# Patient Record
Sex: FEMALE | Race: WHITE | Employment: UNEMPLOYED | URBAN - METROPOLITAN AREA
[De-identification: names, ages, dates, MRNs, and addresses within clinical notes are randomized per-mention and may not be internally consistent; named-entity substitution may affect disease eponyms.]

---

## 2018-01-11 ENCOUNTER — IP HISTORICAL/CONVERTED ENCOUNTER (OUTPATIENT)
Dept: OTHER | Age: 63
End: 2018-01-11

## 2018-04-14 ENCOUNTER — HOSPITAL ENCOUNTER (EMERGENCY)
Age: 63
Discharge: HOME | End: 2018-04-14
Attending: EMERGENCY MEDICINE
Payer: SUBSIDIZED

## 2018-04-14 ENCOUNTER — OFFICE VISIT (OUTPATIENT)
Dept: FAMILY MEDICINE CLINIC | Age: 63
End: 2018-04-14

## 2018-04-14 VITALS
HEART RATE: 75 BPM | BODY MASS INDEX: 34.07 KG/M2 | HEIGHT: 69 IN | DIASTOLIC BLOOD PRESSURE: 89 MMHG | SYSTOLIC BLOOD PRESSURE: 132 MMHG | OXYGEN SATURATION: 96 % | RESPIRATION RATE: 18 BRPM | TEMPERATURE: 98.9 F | WEIGHT: 230 LBS

## 2018-04-14 VITALS
BODY MASS INDEX: 33.99 KG/M2 | WEIGHT: 230.2 LBS | HEART RATE: 69 BPM | SYSTOLIC BLOOD PRESSURE: 174 MMHG | TEMPERATURE: 98.4 F | DIASTOLIC BLOOD PRESSURE: 61 MMHG

## 2018-04-14 DIAGNOSIS — Z13.9 ENCOUNTER FOR SCREENING: ICD-10-CM

## 2018-04-14 DIAGNOSIS — R09.89 LEFT CAROTID BRUIT: ICD-10-CM

## 2018-04-14 DIAGNOSIS — I63.9 CEREBROVASCULAR ACCIDENT (CVA), UNSPECIFIED MECHANISM (HCC): Primary | ICD-10-CM

## 2018-04-14 DIAGNOSIS — R53.1 RIGHT SIDED WEAKNESS: ICD-10-CM

## 2018-04-14 DIAGNOSIS — H53.2 DIPLOPIA: Primary | ICD-10-CM

## 2018-04-14 LAB — GLUCOSE POC: NORMAL MG/DL

## 2018-04-14 PROCEDURE — 99284 EMERGENCY DEPT VISIT MOD MDM: CPT

## 2018-04-14 RX ORDER — ASPIRIN 325 MG
325 TABLET ORAL DAILY
Qty: 1 TAB | Refills: 0 | Status: SHIPPED | OUTPATIENT
Start: 2018-04-14

## 2018-04-14 NOTE — ED PROVIDER NOTES
HPI Comments: 63 y.o. female with past medical history significant for DM, HTN, DVT, stroke and diverticulitis who presents from home with chief complaint of double vision. Pt reports she had 2 strokes 2-3 weeks ago. She was seen at [facility] after her initial stroke, then was sent to [facility] after a second stroke where she received doppler studies and an MRI. She was told at that time her R carotid artery is blocked. Pt was seen at the care clinic this morning for progressively decreasing vision and she developed double vision today. She also states she believes she had a seizure several nights ago d/t an episode of \"blacking out\". She takes ASA daily and quit smoking 1 month ago. There are no other acute medical concerns at this time. PCP: Amanda Ling MD    Note written by Nitza Conteh, as dictated by Bob Holder MD 4:14 PM    The history is provided by the patient. Past Medical History:   Diagnosis Date    Diverticulitis 3/2013    DVT (deep venous thrombosis) (HCC)     Hirsutism     HTN (hypertension)     Type II or unspecified type diabetes mellitus without mention of complication, not stated as uncontrolled     Virilization ([facility]        Past Surgical History:   Procedure Laterality Date    HX OTHER SURGICAL      ovaries removed          History reviewed. No pertinent family history. Social History     Social History    Marital status:      Spouse name: N/A    Number of children: N/A    Years of education: N/A     Occupational History    Not on file.      Social History Main Topics    Smoking status: Former Smoker    Smokeless tobacco: Never Used      Comment: 01/01/2018    Alcohol use No    Drug use: Yes     Special: Marijuana    Sexual activity: Not on file     Other Topics Concern    Not on file     Social History Narrative         ALLERGIES: Pcn [penicillins] and Tramadol    Review of Systems   Constitutional: Negative for chills and fever.   HENT: Negative for ear pain and sore throat. Eyes: Positive for visual disturbance. Negative for photophobia and pain. Respiratory: Negative for chest tightness and shortness of breath. Cardiovascular: Negative for chest pain and leg swelling. Gastrointestinal: Negative for abdominal pain, nausea and vomiting. Genitourinary: Negative for dysuria and flank pain. Musculoskeletal: Negative for back pain and neck pain. Skin: Negative for rash and wound. Neurological: Negative for dizziness, light-headedness and headaches. All other systems reviewed and are negative. Vitals:    04/14/18 1604   BP: 138/82   Pulse: 73   Resp: 18   Temp: 98.9 °F (37.2 °C)   SpO2: 98%   Weight: 104.3 kg (230 lb)   Height: 5' 9\" (1.753 m)            Physical Exam   Constitutional: She is oriented to person, place, and time. She appears well-developed and well-nourished. HENT:   Head: Normocephalic and atraumatic. Eyes: Conjunctivae and EOM are normal. Pupils are equal, round, and reactive to light. Right eye exhibits no discharge. Left eye exhibits no discharge. Neck: Normal range of motion. Neck supple. No bruits on right noted on my exam  Left sided bruit vs referred murmur     Cardiovascular: Normal rate and regular rhythm. Murmur heard. Murmur with referred sounds to left neck, but dissipate after a couple beats with breath holding   Pulmonary/Chest: Effort normal and breath sounds normal. No respiratory distress. She has no wheezes. Abdominal: Soft. Bowel sounds are normal. There is no tenderness. Musculoskeletal: She exhibits no tenderness or deformity. Neurological: She is alert and oriented to person, place, and time. Ambulatory into the ED   Psychiatric: She has a normal mood and affect. Nursing note and vitals reviewed.        MDM  Number of Diagnoses or Management Options  Diplopia:   Diagnosis management comments: Patient with reported stroke diagnosed 3 weeks ago, seen at the [facility] today and sent to the ED to get the care card. Patient with no new EMC, patient with multiple risk factors for stroke and ACS - patient states she stopped smoking and takes aspirin daily. She has no insurance and [facility] did a lot of tests, but she was told they could not fix her neck arteries or double vision. No new testing obtained - after 2 hours - no response from [facility] with our request for medical records. Patient with no new findings or complaints today - d/c and refer to [facility] after she begins process for care card. Advised to return to this ED if new problems arise or symptoms worsen.         ED Course       Procedures

## 2018-04-14 NOTE — DISCHARGE INSTRUCTIONS
Double Vision: Care Instructions  Your Care Instructions  Double vision means seeing two images instead of one. To see normally, your eyes, the muscles that move them, the nerves that send images to your brain, and your brain all have to work together. A problem with any of these parts can cause double vision. Double vision can occur in one or both eyes. It can be horizontal (so the images appear side by side) or vertical (so one image appears above the other). Double vision may be caused by a muscle or nerve problem. Or it may be caused by an eye problem such as a cataract or by a brain problem such as a stroke. When the cause is found, double vision can usually be corrected. To find the cause, you may need tests. These may include blood tests and imaging tests such as MRI. You may need to follow up with an eye doctor or a brain specialist (neurologist) for more testing or treatment. The doctor has checked you carefully, but problems can develop later. If you notice any problems or new symptoms, get medical treatment right away. Follow-up care is a key part of your treatment and safety. Be sure to make and go to all appointments, and call your doctor if you are having problems. It's also a good idea to know your test results and keep a list of the medicines you take. How can you care for yourself at home? · Do not drive or do other things that could be dangerous because of your double vision. · Make your home safe to help prevent injuries. For example, remove throw rugs and electrical cords that could cause falls. Be extra careful when you work with sharp tools or knives. · Ask another adult to stay with you until your vision gets better. When should you call for help? Call 911 anytime you think you may need emergency care. For example, call if:  ? · You have symptoms of a stroke.  These may include:  ¨ Sudden numbness, tingling, weakness, or loss of movement in your face, arm, or leg, especially on only one side of your body. ¨ Sudden vision changes. ¨ Sudden trouble speaking. ¨ Sudden confusion or trouble understanding simple statements. ¨ Sudden problems with walking or balance. ¨ A sudden, severe headache that is different from past headaches. ?Call your doctor now or seek immediate medical care if:  ? · You have vision changes. ? Watch closely for changes in your health, and be sure to contact your doctor if:  ? · You do not get better as expected. Where can you learn more? Go to http://molly-fran.info/. Enter P946 in the search box to learn more about \"Double Vision: Care Instructions. \"  Current as of: March 3, 2017  Content Version: 11.4  © 0914-5500 [employer], [employer]. Care instructions adapted under license by [employer] (which disclaims liability or warranty for this information). If you have questions about a medical condition or this instruction, always ask your healthcare professional. [address] any warranty or liability for your use of this information.

## 2018-04-14 NOTE — PROGRESS NOTES
Emerson Mena seen at d/c, given AVS and reviewed today's visit with patient. ED referral slip given to pt to take to [facility]. Pt to stop at home and  meds and any d/c papers she might have with her from [facility] Jan/Feb stay. APA # given with explanation of how to apply for the care card also. Attempted to reach patient by phone no answer, left VM to call clinic office back and speak to a nurse. All questions were answered to patient satisfaction.

## 2018-04-14 NOTE — PROGRESS NOTES
Results for orders placed or performed in visit on 04/14/18   AMB POC GLUCOSE BLOOD, BY GLUCOSE MONITORING DEVICE   Result Value Ref Range    Glucose  F mg/dL

## 2018-04-14 NOTE — PROGRESS NOTES
Assessment/Plan:       ICD-10-CM ICD-9-CM    1. Cerebrovascular accident (CVA), unspecified mechanism ([facility] I63.9 434.91    2. Left carotid bruit R09.89 785.9    3. Right sided weakness R53.1 728.87    4. Encounter for screening Z13.9 V82.9 AMB POC GLUCOSE BLOOD, BY GLUCOSE MONITORING DEVICE    Greater than 50% of this 25 minute visit was spent in face-to-face counseling/coordination of care regarding diabetes management. Changes made: none  Follow-up Disposition:  Return in about 1 week (around 4/21/2018) for Eryn Kiran follow up. [address], [address] [place] [address]  Phone: [phone] Fax: [phone]    [id number] [address], [facility] [address] [place], [place] [address]  Phone: [phone] Fax: [phone]      [facility]  Subjective:     Chief Complaint   Patient presents with    Other     pt states she had 2 strokes january 2018. Larissa Douglas is a 63 y.o. WHITE [place]  female who speaks [place].  used? no 2 strokes, forgets words. 2+ carotid artery bruit, was told 90% blocked by MRI. No CP or SOB. Double vision starting a few weeks ago. Double vision today with right sided weakness Quit smoking 3 months ago. That was [place], second one was [place]a [place]. Diabetes   Brought in medications? no   Last took medications: today  Last ate: yesterday   Working: no   Physical Activity? no  Measuring glucoses? no  Social History: She reports that she has quit smoking. She has never used smokeless tobacco. She reports that she uses illicit drugs, including Marijuana. She reports that she does not drink alcohol. Family History: Her family history is not on file. Past Surgical History:  has a past surgical history that includes hx other surgical.  Outpatient Medications Prior to Visit   Medication Sig Dispense Refill    NADOLOL PO Take  by mouth.  sitaGLIPtin (JANUVIA) 100 mg tablet Take 100 mg by mouth daily.  SIMVASTATIN PO Take  by mouth.  GLYBURIDE PO Take  by mouth.  LOSARTAN PO Take  by mouth.  PREGABALIN (LYRICA PO) Take  by mouth.  GABAPENTIN PO Take  by mouth.  ALPRAZolam (XANAX) 0.5 mg tablet Take  by mouth. No facility-administered medications prior to visit. A few weeks ago had a seizure, lasted almost a minute.  lets her have marijuana. Taking medications as prescribed? Not sure - didn't bring medications   Any problems to discuss? yes  Objective:     Vitals:    04/14/18 1243   BP: 174/61   Pulse: 69   Temp: 98.4 °F (36.9 °C)   TempSrc: Oral   Weight: 230 lb 3.2 oz (104.4 kg)    No LMP recorded. Patient is postmenopausal.    Results for orders placed or performed in visit on 04/14/18   AMB POC GLUCOSE BLOOD, BY GLUCOSE MONITORING DEVICE   Result Value Ref Range    Glucose  F mg/dL      Wt Readings from Last 2 Encounters:   04/14/18 230 lb 3.2 oz (104.4 kg)   12/11/14 204 lb (92.5 kg)    Weight increased; No results found for: HBA1C, UUD2ICFS, BVP5WPOU   Lab Results   Component Value Date/Time    Creatinine 0.89 07/25/2014 03:09 PM      Lab Results   Component Value Date/Time    GFR est AA 83 07/25/2014 03:09 PM    GFR est non-AA 72 07/25/2014 03:09 PM     No results found for: CHOL, CHOLX, CHLST, CHOLV, HDL, LDL, LDLC, DLDLP, TGLX, TRIGL, TRIGP, CHHD, CHHDX   Lab Results   Component Value Date/Time    ALT (SGPT) 14 07/25/2014 03:09 PM    AST (SGOT) 14 07/25/2014 03:09 PM    Alk. phosphatase 81 07/25/2014 03:09 PM    Bilirubin, total 0.2 07/25/2014 03:09 PM     Constitutional: She appears ill. Hair thinned, difficulty walking, poor balance. Eyes: EOM are normal.   Neck: Neck supple. No thyromegaly present. Cardiovascular: Swooshing carotid bruit, very loud on the left; softer on the right. Normal rate, regular rhythm.   Pulmonary/Chest: Effort normal and breath sounds normal. Musculoskeletal: She exhibits no edema. No ulcers of the lower extremities. Weakness of the left upper & lower extremities. Neuro: Facial asymmetry noted, muscles of facial expression. Assessment/Plan:   Diagnoses and all orders for this visit:    1. Cerebrovascular accident (CVA), unspecified mechanism ([address]    2. Left carotid bruit    3. Right sided weakness    4. Encounter for screening  -     AMB POC GLUCOSE BLOOD, BY GLUCOSE MONITORING DEVICE    They will bring all medications to the ER. Acute evaluation today. Plan follow up primary care with [facility] and specialty care through Care Card (most likely) but can check if would qualify for Access Now. Diabetes Mellitus type 2, under unknown control. Blood pressure under Poor control. Follow-up Disposition:  Return in about 1 week (around 4/21/2018) for [address] [place] [address] follow up. Adelso Phoenix DNP, FNP-BC, BC-ADM  Eloisa Cintron expressed understanding of this plan.

## 2018-04-14 NOTE — PROGRESS NOTES
Coordination of Care  1. Have you been to the ER, urgent care clinic since your last visit? Hospitalized since your last visit? No    2. Have you seen or consulted any other health care providers outside of the [address] [place] [address] since your last visit? Include any pap smears or colon screening. No    Does the patient need refills? N/A    Learning Assessment Complete?  yes

## 2018-04-14 NOTE — ED TRIAGE NOTES
Pt with 2 recent strokes here with continuing weakness and double vision. Went to [facility] for help today and was sent here for evaluation.

## 2018-06-05 ENCOUNTER — OFFICE VISIT (OUTPATIENT)
Dept: FAMILY MEDICINE CLINIC | Age: 63
End: 2018-06-05

## 2018-06-05 VITALS
DIASTOLIC BLOOD PRESSURE: 79 MMHG | HEART RATE: 66 BPM | HEIGHT: 69 IN | RESPIRATION RATE: 17 BRPM | TEMPERATURE: 98.3 F | SYSTOLIC BLOOD PRESSURE: 172 MMHG | OXYGEN SATURATION: 96 %

## 2018-06-05 DIAGNOSIS — R09.89 CAROTID BRUIT, UNSPECIFIED LATERALITY: ICD-10-CM

## 2018-06-05 DIAGNOSIS — Z86.73 HISTORY OF CVA (CEREBROVASCULAR ACCIDENT): Primary | ICD-10-CM

## 2018-06-05 RX ORDER — CLOPIDOGREL BISULFATE 75 MG/1
TABLET ORAL
Refills: 6 | COMMUNITY
Start: 2018-05-21

## 2018-06-05 RX ORDER — METOPROLOL SUCCINATE 50 MG/1
TABLET, EXTENDED RELEASE ORAL
Refills: 5 | COMMUNITY
Start: 2018-05-21

## 2018-06-05 RX ORDER — ATORVASTATIN CALCIUM 20 MG/1
TABLET, FILM COATED ORAL
Refills: 6 | COMMUNITY
Start: 2018-05-21 | End: 2018-08-13 | Stop reason: SDUPTHER

## 2018-06-05 RX ORDER — LISINOPRIL 20 MG/1
TABLET ORAL
Refills: 5 | COMMUNITY
Start: 2018-05-21 | End: 2018-08-13 | Stop reason: SDUPTHER

## 2018-06-05 RX ORDER — GLIPIZIDE 10 MG/1
TABLET ORAL
Refills: 6 | COMMUNITY
Start: 2018-05-21

## 2018-06-05 NOTE — PROGRESS NOTES
[address] [facility] [address]   Office [phone], Fax [phone]      Subjective:     CC:blurry vision and right sided weakness   History provided by patient and friend     HPI:  Veronica Adame is a 63 y.o. WHITE [place]  female with significant history of DM, HTN, CVA, MI of unknown time  presenting for ER follow up that was seen on 04/2018. She was seen for diplopia in 04/2018 and was discharged home to follow up with PCP. Imaging was not done at that visit. Patient reports she had stroke and MI in the past but does not remember the time. States persistence of blurry vision and right sided weakness. States she feels depressed now as she has no income to afford to follow up for any of her medical conditions and was denied for disability services. Patient denies any sleeping problems or feeling of guilty. States good appetite. Denies homicidal or suicidal ideation. .     Patient reports she quit smoking 2 months ago. Patient denies any alcohol use     Of note, patient is poor historian and doesn't recall most things including if she has taken medications this morning or not. Patient lives with her boyfriend and helps her out with her medications.        ROS (bolded are positive):   General Negative for fever, chills, changes in weight, changes in appetite   HEENT Negative for hearing loss, earache, congestion, sore throat   CV Negative for chest pain, palpitations, edema   Respiratory Negative for cough, shortness of breath, wheezing   GI Negative for change in bowel habits, abdominal pain, black or bloody stools, nausea or vomiting    Negative for frequency, dysuria, hematuria, vaginal discharge   MSK Negative for back pain, joint pain, muscle pain   Neuro Negative for dizziness, headache, confusion, weakness             Past Medical History:   Diagnosis Date    Diverticulitis 3/2013    DVT (deep venous thrombosis) (HCC)     Hirsutism     HTN (hypertension)     Type II or unspecified type diabetes mellitus without mention of complication, not stated as uncontrolled     Virilization [facility])        Current Outpatient Prescriptions on File Prior to Visit   Medication Sig Dispense Refill    aspirin (ASPIRIN) 325 mg tablet Take 1 Tab by mouth daily. Indications: Cerebral Thromboembolism Prevention 1 Tab 0    GABAPENTIN PO Take  by mouth.  NADOLOL PO Take  by mouth.  sitaGLIPtin (JANUVIA) 100 mg tablet Take 100 mg by mouth daily.  SIMVASTATIN PO Take  by mouth.  GLYBURIDE PO Take  by mouth.  LOSARTAN PO Take  by mouth.  PREGABALIN (LYRICA PO) Take  by mouth. No current facility-administered medications on file prior to visit. Allergies   Allergen Reactions    Pcn [Penicillins] Rash    Tramadol Nausea and Vomiting       Past Surgical History:   Procedure Laterality Date    HX OTHER SURGICAL      ovaries removed        Social History     Social History    Marital status:      Spouse name: N/A    Number of children: N/A    Years of education: N/A     Occupational History    Not on file. Social History Main Topics    Smoking status: Former Smoker    Smokeless tobacco: Never Used      Comment: 01/01/2018    Alcohol use No    Drug use: Yes     Special: Marijuana    Sexual activity: Not on file     Other Topics Concern    Not on file     Social History Narrative       Patient Active Problem List   Diagnosis Code    Hirsutism L68.0    Virilization ([facility] E25.9         Objective:   Vitals - reviewed  Visit Vitals    /79    Pulse 66    Temp 98.3 °F (36.8 °C) (Oral)    Resp 17    Ht 5' 9\" (1.753 m)    SpO2 96%        Physical Exam   Constitutional: She is oriented to person, place, and time. She appears well-developed and well-nourished. Tearful at times    HENT:   Head: Normocephalic and atraumatic. Neck:   Carotid bruit L>R   Cardiovascular: Normal rate and regular rhythm. Murmur heard.   Pulmonary/Chest: Effort normal and breath sounds normal. No respiratory distress. She has no wheezes. Abdominal: Soft. Bowel sounds are normal.   Musculoskeletal: Normal range of motion. Strength 5/5 bilaterally in upper and lower extremity. Neurological: She is alert and oriented to person, place, and time. No cranial nerve deficit. Skin: Skin is warm and dry. Assessment and orders:       ICD-10-CM ICD-9-CM    1. History of CVA (cerebrovascular accident) Z80.78 V12.54 REFERRAL TO NEUROLOGY   2. Carotid bruit, unspecified laterality R09.89 785.9 REFERRAL TO VASCULAR SURGERY       History of CVA  - No records on file. Patient was seen at [address] [place] [address] and [facility] in the past. Sign medical release form and obtain records. - No focal weakness appreciated on physical exam, CN II-XII intact, strength 5/5 in upper and lower extremity bilaterally, Clear speech  - Refer to Neurology for further work up     Carotid bruit with unknown carotid artery stenosis  - Per reports had dopplers done at [facility] and was told that her neck artery is \" clogged\"   - Obtain records records  - Refer to Vascular surgery    Non compliance with medications  - Patient with poor compliance of medications due to forgetfulness and finances. - Boyfriend to help with giving her medications. Coupon provided to purchase medications for chronic conditions. - Counseled on importance of taking medications on a daily basis. Advised on to increasing activity and eating fruits and vegetables. Pt was discussed and seen by Dr. Alicja Graves (attending physician). I have reviewed patient medical and social history and medications. I have reviewed pertinent labs results and other data. I have discussed the diagnosis with the patient and the intended plan as seen in the above orders. The patient has received an after-visit summary and questions were answered concerning future plans.  I have discussed medication side effects and warnings with the patient as well.     Logan Rod MD  Resident [facility]  06/05/18

## 2018-06-05 NOTE — PROGRESS NOTES
Chief Complaint   Patient presents with   [facility] Follow Up     1. Have you been to the ER, urgent care clinic since your last visit? Hospitalized since your last visit? Yes When: 4/14/18 Where: [facility]    2. Have you seen or consulted any other health care providers outside of the [address] [place] [address] since your last visit? Include any pap smears or colon screening.  No

## 2018-06-05 NOTE — MR AVS SNAPSHOT
[address] 
[phone] Patient: Eloisa Cintron MRN: [id number] OXZ:[id number] Visit Information Date & Time Provider Department Dept. Phone Encounter #  
 6/5/2018  9:45 AM Geetha Penn MD [address] [place] [address] [phone] [id number] Follow-up Instructions Return in about 4 weeks (around 7/3/2018). Upcoming Health Maintenance Date Due Hepatitis C Screening 1955 DTaP/Tdap/Td series (1 - Tdap) 7/23/1978 PAP AKA CERVICAL CYTOLOGY 7/23/1978 BREAST CANCER SCRN MAMMOGRAM 7/23/2007 FOBT Q 1 YEAR AGE 50-75 7/23/2007 ZOSTER VACCINE AGE 60> 5/23/2017 Influenza Age 5 to Adult 8/1/2018 Allergies as of 6/5/2018  Review Complete On: 6/5/2018 By: Geetha Penn MD  
  
 Severity Noted Reaction Type Reactions Pcn [Penicillins]  07/25/2014    Rash Tramadol  07/25/2014    Nausea and Vomiting Current Immunizations  Never Reviewed No immunizations on file. Not reviewed this visit You Were Diagnosed With   
  
 Codes Comments History of CVA (cerebrovascular accident)    -  Primary ICD-10-CM: Z86.73 
ICD-9-CM: V12.54 Carotid stenosis, right     ICD-10-CM: K27.65 ICD-9-CM: 433.10 Vitals BP Pulse Temp Resp Height(growth percentile) SpO2  
 172/79 66 98.3 °F (36.8 °C) (Oral) 17 5' 9\" (1.753 m) 96% OB Status Smoking Status Postmenopausal Former Smoker Vitals History Preferred Pharmacy Pharmacy Name Phone [address], [address] [place] [address] [phone] Your Updated Medication List  
  
   
This list is accurate as of 6/5/18 11:37 AM.  Always use your most recent med list.  
  
  
  
  
 aspirin 325 mg tablet Commonly known as:  ASPIRIN Take 1 Tab by mouth daily. Indications: Cerebral Thromboembolism Prevention  
  
 atorvastatin 20 mg tablet Commonly known as: LIPITOR  
TAKE ONE TABLET BY MOUTH AT BEDTIME  
  
 clopidogrel 75 mg Tab Commonly known as:  PLAVIX TAKE ONE TABLET BY MOUTH ONE TIME DAILY  
  
 GABAPENTIN PO Take  by mouth. glipiZIDE 10 mg tablet Commonly known as:  GLUCOTROL  
TAKE ONE TABLET BY MOUTH ONE TIME DAILY  
  
 GLYBURIDE PO Take  by mouth. JANUVIA 100 mg tablet Generic drug:  SITagliptin Take 100 mg by mouth daily. lisinopril 20 mg tablet Commonly known as:  PRINIVIL, ZESTRIL  
TAKE ONE TABLET BY MOUTH ONE TIME DAILY LOSARTAN PO Take  by mouth. LYRICA PO Take  by mouth.  
  
 metoprolol succinate 50 mg XL tablet Commonly known as:  TOPROL-XL  
TAKE ONE TABLET BY MOUTH ONE TIME DAILY NADOLOL PO Take  by mouth. SIMVASTATIN PO Take  by mouth. We Performed the Following REFERRAL TO NEUROLOGY [YFD53 Custom] Comments:  
 History of CVA, diplopia REFERRAL TO VASCULAR SURGERY [EEH866 Custom] Comments:  
 History of carotid artery stenosis ( per report) Follow-up Instructions Return in about 4 weeks (around 7/3/2018). Referral Information Referral ID Referred By Referred To  
  
 [id number] Latisha, [address] [place], [place] [address]MD Joshua [address] Phone: [phone] Fax: [phone] Visits Status Start Date End Date 1 New Request 6/5/18 6/5/19 If your referral has a status of pending review or denied, additional information will be sent to support the outcome of this decision. Referral ID Referred By Referred To  
 [id number] Timothy Núñez Not Available Visits Status Start Date End Date 1 New Request 6/5/18 6/5/19 If your referral has a status of pending review or denied, additional information will be sent to support the outcome of this decision. Patient Instructions Stroke: Care Instructions Your Care Instructions You have had a stroke. This means that the blood flow to a part of your brain was blocked for some time, which damages the nerve cells in that part of the brain. The part of your body controlled by that part of your brain may not function properly now. The brain is an amazing organ that can heal itself to some degree. The stroke you had damaged part of your brain. But other parts of your brain may take over in some way for the damaged areas. You have already started this process. Your doctor will talk with you about what you can do to prevent another stroke. High blood pressure, high cholesterol, and diabetes are all risk factors for stroke. If you have any of these conditions, work with your doctor to make sure they are under control. Other risk factors for stroke include being overweight, smoking, and not getting regular exercise. Going home may be hard for you and your family. The more you can try to do for yourself, the better. Remember to take each day one at a time. Follow-up care is a key part of your treatment and safety. Be sure to make and go to all appointments, and call your doctor if you are having problems. It's also a good idea to know your test results and keep a list of the medicines you take. How can you care for yourself at home? ? · Enter a stroke rehabilitation (rehab) program, if your doctor recommends it. Physical, speech, and occupational therapies can help you manage bathing, dressing, eating, and other basics of daily living. ? · Do not drive until your doctor says it is okay. ? · It is normal to feel sad or depressed after a stroke. If these feelings last, talk to your doctor. ? · If you are having problems with urine leakage, go to the bathroom at regular times, including when you first wake up and at bedtime. Also, limit fluids after dinner. ? · If you are constipated, drink plenty of fluids, enough so that your urine is light yellow or clear like water.  If you have kidney, heart, or liver disease and have to limit fluids, talk with your doctor before you increase the amount of fluids you drink. Set up a regular time for using the toilet. If you continue to have constipation, your doctor may suggest using a bulking agent, such as Metamucil, or a stool softener, laxative, or enema. Medicines ? · Take your medicines exactly as prescribed. Call your doctor if you think you are having a problem with your medicine. You may be taking several medicines. ACE (angiotensin-converting enzyme) inhibitors, angiotensin II receptor blockers (ARBs), beta-blockers, diuretics (water pills), and calcium channel blockers control your blood pressure. Statins help lower cholesterol. Your doctor may also prescribe medicines for depression, pain, sleep problems, anxiety, or agitation. ? · If your doctor has given you a blood thinner to prevent another stroke, be sure you get instructions about how to take your medicine safely. Blood thinners can cause serious bleeding problems. ? · Do not take any over-the-counter medicines or herbal products without talking to your doctor first.  
? · If you take birth control pills or hormone therapy, talk to your doctor about whether they are right for you. ? For family members and caregivers ? · Make the home safe. Set up a room so that your loved one does not have to climb stairs. Be sure the bathroom is on the same floor. Move throw rugs and furniture that could cause falls. Make sure that the lighting is good. Put grab bars and seats in tubs and showers. ? · Find out what your loved one can do and what he or she needs help with. Try not to do things for your loved one that your loved one can do on his or her own. Help him or her learn and practice new skills. ? · Visit and talk with your loved one often. Try doing activities together that you both enjoy, such as playing cards or board games. Keep in touch with your loved one's friends as much as you can.  Encourage them to visit. ? · Take care of yourself. Do not try to do everything yourself. Ask other family members to help. Eat well, get enough rest, and take time to do things that you enjoy. Keep up with your own doctor visits, and make sure to take your medicines regularly. Get out of the house as much as you can. Join a local support group. Find out if you qualify for home health care visits to help with rehab or for adult day care. When should you call for help? Call 911 anytime you think you may need emergency care. For example, call if: 
? · You have signs of another stroke. These may include: 
¨ Sudden numbness, tingling, weakness, or loss of movement in your face, arm, or leg, especially on only one side of your body. ¨ Sudden vision changes. ¨ Sudden trouble speaking. ¨ Sudden confusion or trouble understanding simple statements. ¨ Sudden problems with walking or balance. ¨ A sudden, severe headache that is different from past headaches. Call 911 even if these symptoms go away in a few minutes. ?Call your doctor now or seek immediate medical care if: 
? · You have new symptoms that may be related to your stroke, such as falls or trouble swallowing. ? Watch closely for changes in your health, and be sure to contact your doctor if you have any problems. Where can you learn more? Go to http://molly-fran.info/. Enter R259 in the search box to learn more about \"Stroke: Care Instructions. \" Current as of: March 20, 2017 Content Version: 11.4 © 1077-5792 [employer]. Care instructions adapted under license by [employer] (which disclaims liability or warranty for this information). If you have questions about a medical condition or this instruction, always ask your healthcare professional. [place] [address] any warranty or liability for your use of this information. Introducing [facility]!    
 Renita Schmitz introduces [facility] patient portal. Now you can access parts of your medical record, email your doctor's office, and request medication refills online. 1. In your internet browser, go to [email or web address] 2. Click on the First Time User? Click Here link in the Sign In box. You will see the New Member Sign Up page. 3. Enter your imgScrimmage Access Code exactly as it appears below. You will not need to use this code after youve completed the sign-up process. If you do not sign up before the expiration date, you must request a new code. · imgScrimmage Access Code: [email or web address] Expires: 7/13/2018  4:04 PM 
 
4. Enter the last four digits of your Social Security Number (xxxx) and Date of Birth (mm/dd/yyyy) as indicated and click Submit. You will be taken to the next sign-up page. 5. Create a imgScrimmage ID. This will be your imgScrimmage login ID and cannot be changed, so think of one that is secure and easy to remember. 6. Create a imgScrimmage password. You can change your password at any time. 7. Enter your Password Reset Question and Answer. This can be used at a later time if you forget your password. 8. Enter your e-mail address. You will receive e-mail notification when new information is available in [address] 9. Click Sign Up. You can now view and download portions of your medical record. 10. Click the Download Summary menu link to download a portable copy of your medical information. If you have questions, please visit the Frequently Asked Questions section of the imgScrimmage website. Remember, imgScrimmage is NOT to be used for urgent needs. For medical emergencies, dial 911. Now available from your iPhone and Android! Please provide this summary of care documentation to your next provider. Your primary care clinician is listed as Chris Lorenz. If you have any questions after today's visit, please call [phone].

## 2018-06-05 NOTE — PATIENT INSTRUCTIONS
Stroke: Care Instructions  Your Care Instructions    You have had a stroke. This means that the blood flow to a part of your brain was blocked for some time, which damages the nerve cells in that part of the brain. The part of your body controlled by that part of your brain may not function properly now. The brain is an amazing organ that can heal itself to some degree. The stroke you had damaged part of your brain. But other parts of your brain may take over in some way for the damaged areas. You have already started this process. Your doctor will talk with you about what you can do to prevent another stroke. High blood pressure, high cholesterol, and diabetes are all risk factors for stroke. If you have any of these conditions, work with your doctor to make sure they are under control. Other risk factors for stroke include being overweight, smoking, and not getting regular exercise. Going home may be hard for you and your family. The more you can try to do for yourself, the better. Remember to take each day one at a time. Follow-up care is a key part of your treatment and safety. Be sure to make and go to all appointments, and call your doctor if you are having problems. It's also a good idea to know your test results and keep a list of the medicines you take. How can you care for yourself at home? ? · Enter a stroke rehabilitation (rehab) program, if your doctor recommends it. Physical, speech, and occupational therapies can help you manage bathing, dressing, eating, and other basics of daily living. ? · Do not drive until your doctor says it is okay. ? · It is normal to feel sad or depressed after a stroke. If these feelings last, talk to your doctor. ? · If you are having problems with urine leakage, go to the bathroom at regular times, including when you first wake up and at bedtime. Also, limit fluids after dinner.    ? · If you are constipated, drink plenty of fluids, enough so that your urine is light yellow or clear like water. If you have kidney, heart, or liver disease and have to limit fluids, talk with your doctor before you increase the amount of fluids you drink. Set up a regular time for using the toilet. If you continue to have constipation, your doctor may suggest using a bulking agent, such as Metamucil, or a stool softener, laxative, or enema. Medicines  ? · Take your medicines exactly as prescribed. Call your doctor if you think you are having a problem with your medicine. You may be taking several medicines. ACE (angiotensin-converting enzyme) inhibitors, angiotensin II receptor blockers (ARBs), beta-blockers, diuretics (water pills), and calcium channel blockers control your blood pressure. Statins help lower cholesterol. Your doctor may also prescribe medicines for depression, pain, sleep problems, anxiety, or agitation. ? · If your doctor has given you a blood thinner to prevent another stroke, be sure you get instructions about how to take your medicine safely. Blood thinners can cause serious bleeding problems. ? · Do not take any over-the-counter medicines or herbal products without talking to your doctor first.   ? · If you take birth control pills or hormone therapy, talk to your doctor about whether they are right for you. ? For family members and caregivers  ? · Make the home safe. Set up a room so that your loved one does not have to climb stairs. Be sure the bathroom is on the same floor. Move throw rugs and furniture that could cause falls. Make sure that the lighting is good. Put grab bars and seats in tubs and showers. ? · Find out what your loved one can do and what he or she needs help with. Try not to do things for your loved one that your loved one can do on his or her own. Help him or her learn and practice new skills. ? · Visit and talk with your loved one often. Try doing activities together that you both enjoy, such as playing cards or board games. Keep in touch with your loved one's friends as much as you can. Encourage them to visit. ? · Take care of yourself. Do not try to do everything yourself. Ask other family members to help. Eat well, get enough rest, and take time to do things that you enjoy. Keep up with your own doctor visits, and make sure to take your medicines regularly. Get out of the house as much as you can. Join a local support group. Find out if you qualify for home health care visits to help with rehab or for adult day care. When should you call for help? Call 911 anytime you think you may need emergency care. For example, call if:  ? · You have signs of another stroke. These may include:  ¨ Sudden numbness, tingling, weakness, or loss of movement in your face, arm, or leg, especially on only one side of your body. ¨ Sudden vision changes. ¨ Sudden trouble speaking. ¨ Sudden confusion or trouble understanding simple statements. ¨ Sudden problems with walking or balance. ¨ A sudden, severe headache that is different from past headaches. Call 911 even if these symptoms go away in a few minutes. ?Call your doctor now or seek immediate medical care if:  ? · You have new symptoms that may be related to your stroke, such as falls or trouble swallowing. ? Watch closely for changes in your health, and be sure to contact your doctor if you have any problems. Where can you learn more? Go to http://molly-fran.info/. Enter G510 in the search box to learn more about \"Stroke: Care Instructions. \"  Current as of: March 20, 2017  Content Version: 11.4  © 7203-4732 [employer]. Care instructions adapted under license by [employer] (which disclaims liability or warranty for this information). If you have questions about a medical condition or this instruction, always ask your healthcare professional. [address] any warranty or liability for your use of this information.

## 2018-06-06 ENCOUNTER — DOCUMENTATION ONLY (OUTPATIENT)
Dept: FAMILY MEDICINE CLINIC | Age: 63
End: 2018-06-06

## 2018-06-06 NOTE — PROGRESS NOTES
Records from [facility]:     Active Medications starting 01/2018 include  Aspirin 162 mg PO daily  Atorvastatin calcium 20 mg PO daily  Clopidogrel 75 mg daily  Glipizide 10 mg PO daily  Lisinopril 20 mg PO daily  Metoprolol succinate 50 mg PO daily      Records from [facility]:   Cardiac Cath on 04/21/2016: by Dr. Monica Garces  1. Left main , LAD and left circumflex no disease or stenosis  2. RCA, Right dominant vessel with 100% occluded in-stent stenosis in distal RCA and proximal PLV obstructing low significantly to the PDA  3. Successful PTCA/drug eluting stent utilizing Xience 3.5x28 mm reducing 100% stenosis to 0%  4. PTCA of PDA opening vessel with good flow  5. No left ventriculography as patient had recent cardiac cath    Recommendations:  1. Aspirin for life  2. Plavix for at least 1 year  3. Ace inhibitor, BB and statin if there is not contraindication      MRI Brain WO on 01/12/ 2018: [facility] center   Multiple small acute ischemic infarcts of the left temporoparietal region. No acute hemorrhage or mass  Mild periventricular and left pontine ischemic disease. Carotid Duplex on 01/12/2018: [facility]  1. On the right, diffuse intimal thicekning with plaque at the carotid bifurcation. Internal carotid artery with 50% stenosis. Severe external carotid artery stenosis  2. On the left, apparent occlusion of the common carotid arttery with lack of flow through the proximal internal and external carotid arteries. Flow is identified at the mid external carotid and mild internal carotid artery. 3. Vertebral arteries patent with forward flow bilaterally  4. Potential left subclavian stenosis. CTA neck on 01/25/2018: [id number] [facility] [address]   1. 50% stenosis of the origin of the right internal carotid artery by NASCET criteria  2.  Total occlusion of left internal carotid artery       Recent lab from 04/03/2018  CBC: WBC 8.2, Hgb 14.4, HCT 43.5, Platelets 190  CMP: Glucose 163, BUN 17, Cr 0.88, Na 141, K 4.9, Cl 99 and HCO3 26. T. felix <0.2, AST 10, ALT 12   Lipid panel:  Total Cholesterol 163, , HDL 54, LDL 80  A1C 6.8

## 2018-06-19 ENCOUNTER — DOCUMENTATION ONLY (OUTPATIENT)
Dept: FAMILY MEDICINE CLINIC | Age: 63
End: 2018-06-19

## 2018-07-05 ENCOUNTER — OFFICE VISIT (OUTPATIENT)
Dept: NEUROLOGY | Age: 63
End: 2018-07-05

## 2018-07-05 VITALS
DIASTOLIC BLOOD PRESSURE: 84 MMHG | BODY MASS INDEX: 34.07 KG/M2 | WEIGHT: 230 LBS | RESPIRATION RATE: 20 BRPM | SYSTOLIC BLOOD PRESSURE: 138 MMHG | HEIGHT: 69 IN

## 2018-07-05 DIAGNOSIS — R41.3 MEMORY CHANGES: Primary | ICD-10-CM

## 2018-07-05 NOTE — PROGRESS NOTES
Neurology Consult      Subjective:      Crystal Tripp is a 63 y.o. female who comes in today with her friend. It is a very long-winded complicated history but apparently the latest version as she may have suffered 2 strokes in the context of recent years? Has been to [facility] on 2 occasions. Ended up at [facility] for a week in January of this year for stroke concern. Would end up that [facility] in April of this year with the history that she had significant stenosis of carotid arteries some visual changes and suggestions of medical noncompliance? There was a reference to right-sided weakness? Duly noted background hyperlipidemia diabetes hypertension coronary artery disease and history of MI and the 2 strokes. Noteworthy for obesity and had recently quit smoking. No insurance. Was referred to [facility] ER on the same day from [facility] and again the history was duly noted. They try to get information from [facility] but after 2 hours there was no intake. Apparently the history goes that [facility] could not improve upon the carotid artery stenosis and the decision was to refer her back to [facility]. Was on 325 mg strength aspirin and simvastatin and diabetic meds. Exam of the time categorize left-sided weakness and facial asymmetry and bruits on the left greater than right sides? Ended up following up with [facility] last month with a normal exam and suggestion to follow-up with neurology and vascular surgery and outpatient [facility] services. Unfortunately the referral to vascular surgery had been completely unseen and remains undone. Is here today with the neuro referral.    [place] over issues today that included diminished gait and fall prone status and uses a walker. Patient does not know why she falls. Says she has no insurance and cannot afford physical therapy.   Concerned that she cannot see out of both eyes but has not seen an eye doctor in recent times. Currently uses just reading glasses. Apparently he has snoring at night so there could be an obstructive sleep apnea component in the picture as well although the services are declined. Also grave concerns about memory impairment that have been in place over the last 2 years? There was a [facility] MRI done in January that apparently was picked up by [facility] and shown to have multiple small ischemic infarcts in the left temporal parietal area and mild periventricular and left pontine ischemic disease. Carotid Dopplers supposedly showed 50% stenosis of the right ICA and complete occlusion of the left CCA and vertebral artery flow patent bilaterally. [facility] CTA of the neck showed 50% stenosis proximal right ICA and total occlusion of the left ICA. Not sure [facility] whether a CTA of the head was done or echocardiogram or other cardiac studies etc.         Current Outpatient Prescriptions   Medication Sig Dispense Refill    atorvastatin (LIPITOR) 20 mg tablet TAKE ONE TABLET BY MOUTH AT BEDTIME  6    clopidogrel (PLAVIX) 75 mg tab TAKE ONE TABLET BY MOUTH ONE TIME DAILY  6    glipiZIDE (GLUCOTROL) 10 mg tablet TAKE ONE TABLET BY MOUTH ONE TIME DAILY  6    lisinopril (PRINIVIL, ZESTRIL) 20 mg tablet TAKE ONE TABLET BY MOUTH ONE TIME DAILY  5    metoprolol succinate (TOPROL-XL) 50 mg XL tablet TAKE ONE TABLET BY MOUTH ONE TIME DAILY  5    aspirin (ASPIRIN) 325 mg tablet Take 1 Tab by mouth daily. Indications: Cerebral Thromboembolism Prevention 1 Tab 0    NADOLOL PO Take  by mouth.  sitaGLIPtin (JANUVIA) 100 mg tablet Take 100 mg by mouth daily.  SIMVASTATIN PO Take  by mouth.  GLYBURIDE PO Take  by mouth.  LOSARTAN PO Take  by mouth.  PREGABALIN (LYRICA PO) Take  by mouth.  GABAPENTIN PO Take  by mouth.         Allergies   Allergen Reactions    Pcn [Penicillins] Rash    Tramadol Nausea and Vomiting     Past Medical History:   Diagnosis Date    Diverticulitis 3/2013    [facility]     Hirsutism     HTN (hypertension)     Type II or unspecified type diabetes mellitus without mention of complication, not stated as uncontrolled     Virilization (HCC)       Past Surgical History:   Procedure Laterality Date    HX OTHER SURGICAL      ovaries removed       Social History     Social History    Marital status:      Spouse name: N/A    Number of children: N/A    Years of education: N/A     Occupational History    Not on file. Social History Main Topics    Smoking status: Former Smoker    Smokeless tobacco: Never Used      Comment: 01/01/2018    Alcohol use No    Drug use: Yes     Special: Marijuana    Sexual activity: Not on file     Other Topics Concern    Not on file     Social History Narrative      No family history on file. Visit Vitals    /84    Resp 20    Ht 5' 9\" (1.753 m)    Wt 104.3 kg (230 lb)    BMI 33.97 kg/m2        Review of Systems:   A comprehensive review of systems was negative except for that written in the HPI. Neuro Exam:     Appearance: The patient is well developed, well nourished, unable to provide a coherent history and is in no acute distress. Mental Status: Oriented to name but not day date month or year. Mood and affect appropriate but occasionally defensive. Patient very distractable on 1,2,3 step commands. Cranial Nerves:   Intact visual fields? Fundi are benign. ACACIA, EOM's full but slow and distractible, no nystagmus, no ptosis. Facial sensation is normal. Corneal reflexes are intact. Facial movement is symmetric. Hearing is normal bilaterally. Palate is midline with normal sternocleidomastoid and trapezius muscles are normal. Tongue is midline. Motor:  5/5 strength in upper and lower proximal and distal muscles. Normal bulk and tone. No fasciculations. Reflexes:   Deep tendon reflexes 0-1+/4 and symmetrical.   Sensory:    Diminished distally approaching moderate to touch, pinprick and vibration. Position sense intact. Gait:   Transfers and gait are taken is extremely slow and cautious and has difficulty with foot placement etc.    Tremor:   No tremor noted. Cerebellar:  No cerebellar signs present. Neurovascular:  Normal heart sounds and regular rhythm, peripheral pulses intact, and no carotid bruits. Assessment:   Complicated case with cumulative medical concerns over several years and no insurance. Will suggest these issues could be additive and cutting down on her quality of life and her day by day existence. Has significant risk factor modification that needs to be watched with diabetes hypertension coronary artery disease hyperlipidemia memory impairment etc.  At this time will refer to ophthalmology as this could impact her walking and being fall prone. Will refer to neuropsychology for formal memory testing. Suggest he follow-up with cardiology regarding cardiac status. Follow-up with primary care regarding risk factor modification. Currently there is a pushback on sleep medicine referral for potential obstructive sleep apnea but will duly note this was offered. Needs to follow through with vascular surgery referral was made from primary care practice office. Will eventually suggest physical therapy once the visual issue has been addressed and need information from [facility] on 2 stroke occasions and [facility] from January of this year. Additional suggestions could follow. Plan:   Revisit pended.   Signed by :  Kathleen Desouza MD

## 2018-07-05 NOTE — PROGRESS NOTES
Stroke- 3 strokes the family member thought it was just 2, in the last 2 years   1 heart attack in the last 2 years     Bad vision- capillaries blocking her vision   Carotid arteries are blocked thinking about 60-85%   Speech- tongue tied sometimes, cindy Arias thought process is not very clear   Word finding, not making much sense when she is trying to carry on a conversation

## 2018-07-05 NOTE — PATIENT INSTRUCTIONS
A Healthy Lifestyle: Care Instructions  Your Care Instructions    A healthy lifestyle can help you feel good, stay at a healthy weight, and have plenty of energy for both work and play. A healthy lifestyle is something you can share with your whole family. A healthy lifestyle also can lower your risk for serious health problems, such as high blood pressure, heart disease, and diabetes. You can follow a few steps listed below to improve your health and the health of your family. Follow-up care is a key part of your treatment and safety. Be sure to make and go to all appointments, and call your doctor if you are having problems. It's also a good idea to know your test results and keep a list of the medicines you take. How can you care for yourself at home? · Do not eat too much sugar, fat, or fast foods. You can still have dessert and treats now and then. The goal is moderation. · Start small to improve your eating habits. Pay attention to portion sizes, drink less juice and soda pop, and eat more fruits and vegetables. ¨ Eat a healthy amount of food. A 3-ounce serving of meat, for example, is about the size of a deck of cards. Fill the rest of your plate with vegetables and whole grains. ¨ Limit the amount of soda and sports drinks you have every day. Drink more water when you are thirsty. ¨ Eat at least 5 servings of fruits and vegetables every day. It may seem like a lot, but it is not hard to reach this goal. A serving or helping is 1 piece of fruit, 1 cup of vegetables, or 2 cups of leafy, raw vegetables. Have an apple or some carrot sticks as an afternoon snack instead of a candy bar. Try to have fruits and/or vegetables at every meal.  · Make exercise part of your daily routine. You may want to start with simple activities, such as walking, bicycling, or slow swimming. Try to be active 30 to 60 minutes every day. You do not need to do all 30 to 60 minutes all at once.  For example, you can exercise 3 times a day for 10 or 20 minutes. Moderate exercise is safe for most people, but it is always a good idea to talk to your doctor before starting an exercise program.  · Keep moving. [place] the lawn, work in the garden, or [employer]. Take the stairs instead of the elevator at work. · If you smoke, quit. People who smoke have an increased risk for heart attack, stroke, cancer, and other lung illnesses. Quitting is hard, but there are ways to boost your chance of quitting tobacco for good. ¨ Use nicotine gum, patches, or lozenges. ¨ Ask your doctor about stop-smoking programs and medicines. ¨ Keep trying. In addition to reducing your risk of diseases in the future, you will notice some benefits soon after you stop using tobacco. If you have shortness of breath or asthma symptoms, they will likely get better within a few weeks after you quit. · Limit how much alcohol you drink. Moderate amounts of alcohol (up to 2 drinks a day for men, 1 drink a day for women) are okay. But drinking too much can lead to liver problems, high blood pressure, and other health problems. Family health  If you have a family, there are many things you can do together to improve your health. · Eat meals together as a family as often as possible. · Eat healthy foods. This includes fruits, vegetables, lean meats and dairy, and whole grains. · Include your family in your fitness plan. Most people think of activities such as jogging or tennis as the way to fitness, but there are many ways you and your family can be more active. Anything that makes you breathe hard and gets your heart pumping is exercise. Here are some tips:  ¨ Walk to do errands or to take your child to school or the bus. ¨ Go for a family bike ride after dinner instead of watching TV. Where can you learn more? Go to http://molly-fran.info/. Enter O892 in the search box to learn more about \"A Healthy Lifestyle: Care Instructions. \"  Current as of: May 12, 2017  Content Version: 11.4  © 1589-3297 Healthwise, [employer]. Care instructions adapted under license by [employer] (which disclaims liability or warranty for this information). If you have questions about a medical condition or this instruction, always ask your healthcare professional. [address]  any warranty or liability for your use of this information. Patient history reviewed and patient examined. A very complex case of collections of concerns that have been in place for several years. We need information from several sources that may go back several years. Will petition [facility] and [facility].  Suggestions would go to having her eyes formally checked as to her visual handicap. This would also explain in addition to other difficulties why ambulation is curtailed. Along the same lines would need a formal neuropsych evaluation of memory performance. Current concerns for possible obstructive sleep apnea but will put that on the back burner so to speak at the request of patient and family. Needs to follow through with vascular surgery referral from family practice. Needs risk factor modification that would go to reducing her next risk of stroke heart disease and similar concerns as it applies to diabetes hyperlipidemia hypertension cardiac disease. Needs follow-up with cardiology as well.   We will pend the revisit here until we can get some information in place etc.

## 2018-07-05 NOTE — MR AVS SNAPSHOT
[address] [id number] [address] [address] 
[phone] Patient: Hollis Francois MRN: [id number] RVI:[id number] Visit Information Date & Time Provider Department Dept. Phone Encounter #  
 7/5/2018  1:00 PM Petey Ambrosio MD [address] [facility] [phone] [id number] Follow-up Instructions Return if symptoms worsen or fail to improve. Upcoming Health Maintenance Date Due Hepatitis C Screening 1955 DTaP/Tdap/Td series (1 - Tdap) 7/23/1978 PAP AKA CERVICAL CYTOLOGY 7/23/1978 BREAST CANCER SCRN MAMMOGRAM 7/23/2007 FOBT Q 1 YEAR AGE 50-75 7/23/2007 ZOSTER VACCINE AGE 60> 5/23/2017 Influenza Age 5 to Adult 8/1/2018 Allergies as of 7/5/2018  Review Complete On: 7/5/2018 By: Kayla Ren LPN Severity Noted Reaction Type Reactions Pcn [Penicillins]  07/25/2014    Rash Tramadol  07/25/2014    Nausea and Vomiting Current Immunizations  Never Reviewed No immunizations on file. Not reviewed this visit You Were Diagnosed With   
  
 Codes Comments Memory changes    -  Primary ICD-10-CM: R41.3 ICD-9-CM: 780.93 Vitals BP Resp Height(growth percentile) Weight(growth percentile) BMI OB Status 138/84 20 5' 9\" (1.753 m) 230 lb (104.3 kg) 33.97 kg/m2 Postmenopausal  
 Smoking Status Former Smoker Vitals History BMI and BSA Data Body Mass Index Body Surface Area  
 33.97 kg/m 2 2.25 m 2 Preferred Pharmacy Pharmacy Name Phone [address], [address] [place] [address] [phone] Your Updated Medication List  
  
   
This list is accurate as of 7/5/18  1:55 PM.  Always use your most recent med list.  
  
  
  
  
 aspirin 325 mg tablet Commonly known as:  ASPIRIN Take 1 Tab by mouth daily.  Indications: Cerebral Thromboembolism Prevention  
  
 atorvastatin 20 mg tablet Commonly known as:  LIPITOR  
TAKE ONE TABLET BY MOUTH AT BEDTIME  
  
 clopidogrel 75 mg Tab Commonly known as:  PLAVIX TAKE ONE TABLET BY MOUTH ONE TIME DAILY  
  
 GABAPENTIN PO Take  by mouth. glipiZIDE 10 mg tablet Commonly known as:  GLUCOTROL  
TAKE ONE TABLET BY MOUTH ONE TIME DAILY  
  
 GLYBURIDE PO Take  by mouth. JANUVIA 100 mg tablet Generic drug:  SITagliptin Take 100 mg by mouth daily. lisinopril 20 mg tablet Commonly known as:  PRINIVIL, ZESTRIL  
TAKE ONE TABLET BY MOUTH ONE TIME DAILY LOSARTAN PO Take  by mouth. LYRICA PO Take  by mouth.  
  
 metoprolol succinate 50 mg XL tablet Commonly known as:  TOPROL-XL  
TAKE ONE TABLET BY MOUTH ONE TIME DAILY NADOLOL PO Take  by mouth. SIMVASTATIN PO Take  by mouth. We Performed the Following REFERRAL TO PSYCHOLOGY [JCR48 Custom] Comments:  
 Significant cumulative memory impairment which may explain several parts of her story line perhaps over years? Follow-up Instructions Return if symptoms worsen or fail to improve. Referral Information Referral ID Referred By Referred To  
  
 [id number] [facility] Cisneros [address] [place] [address] sbismark Tong, [facility] Phone: [phone] Fax: [phone] Visits Status Start Date End Date 1 New Request 7/5/18 7/5/19 If your referral has a status of pending review or denied, additional information will be sent to support the outcome of this decision. Patient Instructions A Healthy Lifestyle: Care Instructions Your Care Instructions A healthy lifestyle can help you feel good, stay at a healthy weight, and have plenty of energy for both work and play. A healthy lifestyle is something you can share with your whole family.  
A healthy lifestyle also can lower your risk for serious health problems, such as high blood pressure, heart disease, and diabetes. You can follow a few steps listed below to improve your health and the health of your family. Follow-up care is a key part of your treatment and safety. Be sure to make and go to all appointments, and call your doctor if you are having problems. It's also a good idea to know your test results and keep a list of the medicines you take. How can you care for yourself at home? · Do not eat too much sugar, fat, or fast foods. You can still have dessert and treats now and then. The goal is moderation. · Start small to improve your eating habits. Pay attention to portion sizes, drink less juice and soda pop, and eat more fruits and vegetables. ¨ Eat a healthy amount of food. A 3-ounce serving of meat, for example, is about the size of a deck of cards. Fill the rest of your plate with vegetables and whole grains. ¨ Limit the amount of soda and sports drinks you have every day. Drink more water when you are thirsty. ¨ Eat at least 5 servings of fruits and vegetables every day. It may seem like a lot, but it is not hard to reach this goal. A serving or helping is 1 piece of fruit, 1 cup of vegetables, or 2 cups of leafy, raw vegetables. Have an apple or some carrot sticks as an afternoon snack instead of a candy bar. Try to have fruits and/or vegetables at every meal. 
· Make exercise part of your daily routine. You may want to start with simple activities, such as walking, bicycling, or slow swimming. Try to be active 30 to 60 minutes every day. You do not need to do all 30 to 60 minutes all at once. For example, you can exercise 3 times a day for 10 or 20 minutes. Moderate exercise is safe for most people, but it is always a good idea to talk to your doctor before starting an exercise program. 
· Keep moving. [employer] the lawn, work in the garden, or [employer]. Take the stairs instead of the elevator at work. · If you smoke, quit.  People who smoke have an increased risk for heart attack, stroke, cancer, and other lung illnesses. Quitting is hard, but there are ways to boost your chance of quitting tobacco for good. ¨ Use nicotine gum, patches, or lozenges. ¨ Ask your doctor about stop-smoking programs and medicines. ¨ Keep trying. In addition to reducing your risk of diseases in the future, you will notice some benefits soon after you stop using tobacco. If you have shortness of breath or asthma symptoms, they will likely get better within a few weeks after you quit. · Limit how much alcohol you drink. Moderate amounts of alcohol (up to 2 drinks a day for men, 1 drink a day for women) are okay. But drinking too much can lead to liver problems, high blood pressure, and other health problems. Family health If you have a family, there are many things you can do together to improve your health. · Eat meals together as a family as often as possible. · Eat healthy foods. This includes fruits, vegetables, lean meats and dairy, and whole grains. · Include your family in your fitness plan. Most people think of activities such as jogging or tennis as the way to fitness, but there are many ways you and your family can be more active. Anything that makes you breathe hard and gets your heart pumping is exercise. Here are some tips: 
¨ Walk to do errands or to take your child to school or the bus. ¨ Go for a family bike ride after dinner instead of watching TV. Where can you learn more? Go to http://molly-fran.info/. Enter H930 in the search box to learn more about \"A Healthy Lifestyle: Care Instructions. \" Current as of: May 12, 2017 Content Version: 11.4 © 4773-8163 Healthwise, Incorporated. Care instructions adapted under license by [employer] (which disclaims liability or warranty for this information).  If you have questions about a medical condition or this instruction, always ask your healthcare professional. Mar[employer] disclaims any warranty or liability for your use of this information. Patient history reviewed and patient examined. A very complex case of collections of concerns that have been in place for several years. We need information from several sources that may go back several years. Will petition [facility] and [facility].  Suggestions would go to having her eyes formally checked as to her visual handicap. This would also explain in addition to other difficulties why ambulation is curtailed. Along the same lines would need a formal neuropsych evaluation of memory performance. Current concerns for possible obstructive sleep apnea but will put that on the back burner so to speak at the request of patient and family. Needs to follow through with vascular surgery referral from family practice. Needs risk factor modification that would go to reducing her next risk of stroke heart disease and similar concerns as it applies to diabetes hyperlipidemia hypertension cardiac disease. Needs follow-up with cardiology as well. We will pend the revisit here until we can get some information in place etc. 
 
 
  
Introducing hospitals & HEALTH SERVICES! Kike Jose introduces Mindframe patient portal. Now you can access parts of your medical record, email your doctor's office, and request medication refills online. 1. In your internet browser, go to [email or web address] 2. Click on the First Time User? Click Here link in the Sign In box. You will see the New Member Sign Up page. 3. Enter your Mindframe Access Code exactly as it appears below. You will not need to use this code after youve completed the sign-up process. If you do not sign up before the expiration date, you must request a new code. · Mindframe Access Code: [email or web address] Expires: 7/13/2018  4:04 PM 
 
4.  Enter the last four digits of your Social Security Number (xxxx) and Date of Birth (mm/dd/yyyy) as indicated and click Submit. You will be taken to the next sign-up page. 5. Create a Schedule Savvy ID. This will be your Schedule Savvy login ID and cannot be changed, so think of one that is secure and easy to remember. 6. Create a Schedule Savvy password. You can change your password at any time. 7. Enter your Password Reset Question and Answer. This can be used at a later time if you forget your password. 8. Enter your e-mail address. You will receive e-mail notification when new information is available in [address]. 9. Click Sign Up. You can now view and download portions of your medical record. 10. Click the Download Summary menu link to download a portable copy of your medical information. If you have questions, please visit the Frequently Asked Questions section of the Schedule Savvy website. Remember, Schedule Savvy is NOT to be used for urgent needs. For medical emergencies, dial 911. Now available from your iPhone and Android! Please provide this summary of care documentation to your next provider. Your primary care clinician is listed as Chris Lornez. If you have any questions after today's visit, please call [phone].

## 2018-07-26 ENCOUNTER — OFFICE VISIT (OUTPATIENT)
Dept: SURGERY | Age: 63
End: 2018-07-26

## 2018-07-26 VITALS
DIASTOLIC BLOOD PRESSURE: 89 MMHG | HEIGHT: 69 IN | OXYGEN SATURATION: 96 % | SYSTOLIC BLOOD PRESSURE: 182 MMHG | WEIGHT: 232 LBS | BODY MASS INDEX: 34.36 KG/M2 | TEMPERATURE: 98.7 F | HEART RATE: 67 BPM

## 2018-07-26 DIAGNOSIS — I77.9 BILATERAL CAROTID ARTERY DISEASE (HCC): Primary | ICD-10-CM

## 2018-07-26 NOTE — MR AVS SNAPSHOT
[address], [address] [facility], [address] [place] [address] [facility] 
[phone] Patient: Beth Burgos MRN: [id number] [facility]:[birth year] Visit Information Date & Time Provider Department Dept. Phone Encounter #  
 7/26/2018  1:20 PM Poonam Rouse MD [facility] [address] [phone] [id number] Your Appointments 2/25/2019  1:00 PM  
New Patient with Shawn Hernández PsyD [address] ([address]) Appt Note: new patient memory impairment/ [place] [address] [facility] [address] [id number] [phone]  
  
   
 [place] [address] [address] [address] Upcoming Health Maintenance Date Due Hepatitis C Screening 1955 DTaP/Tdap/Td series (1 - Tdap) 7/23/1978 PAP AKA CERVICAL CYTOLOGY 7/23/1978 BREAST CANCER SCRN MAMMOGRAM 7/23/2007 FOBT Q 1 YEAR AGE 50-75 7/23/2007 ZOSTER VACCINE AGE 60> 5/23/2017 Influenza Age 5 to Adult 8/1/2018 Allergies as of 7/26/2018  Review Complete On: 7/26/2018 By: Poonam Rouse MD  
  
 Severity Noted Reaction Type Reactions Pcn [Penicillins]  07/25/2014    Rash Tramadol  07/25/2014    Nausea and Vomiting Current Immunizations  Never Reviewed No immunizations on file. Not reviewed this visit You Were Diagnosed With   
  
 Codes Comments Bilateral [facility]    -  Primary ICD-10-CM: I77.9 ICD-9-CM: 067. 9 Vitals BP Pulse Temp Height(growth percentile) Weight(growth percentile) SpO2  
 182/89 (BP 1 Location: Right arm, BP Patient Position: Sitting) 67 98.7 °F (37.1 °C) 5' 9\" (1.753 m) 232 lb (105.2 kg) 96% BMI OB Status Smoking Status 34.26 kg/m2 Postmenopausal Former Smoker BMI and BSA Data Body Mass Index Body Surface Area  
 34.26 kg/m 2 2.26 m 2 Preferred Pharmacy Pharmacy Name Phone [address], [address] [facility] [phone] Your Updated Medication List  
  
   
This list is accurate as of 7/26/18  4:34 PM.  Always use your most recent med list.  
  
  
  
  
 aspirin 325 mg tablet Commonly known as:  ASPIRIN Take 1 Tab by mouth daily. Indications: Cerebral Thromboembolism Prevention  
  
 atorvastatin 20 mg tablet Commonly known as:  LIPITOR  
TAKE ONE TABLET BY MOUTH AT BEDTIME  
  
 clopidogrel 75 mg Tab Commonly known as:  PLAVIX TAKE ONE TABLET BY MOUTH ONE TIME DAILY  
  
 GABAPENTIN PO Take  by mouth. glipiZIDE 10 mg tablet Commonly known as:  GLUCOTROL  
TAKE ONE TABLET BY MOUTH ONE TIME DAILY  
  
 GLYBURIDE PO Take  by mouth. JANUVIA 100 mg tablet Generic drug:  SITagliptin Take 100 mg by mouth daily. lisinopril 20 mg tablet Commonly known as:  PRINIVIL, ZESTRIL  
TAKE ONE TABLET BY MOUTH ONE TIME DAILY LOSARTAN PO Take  by mouth. LYRICA PO Take  by mouth.  
  
 metoprolol succinate 50 mg XL tablet Commonly known as:  TOPROL-XL  
TAKE ONE TABLET BY MOUTH ONE TIME DAILY NADOLOL PO Take  by mouth. SIMVASTATIN PO Take  by mouth. Introducing [facility]! [employer] introduces [employer] patient portal. Now you can access parts of your medical record, email your doctor's office, and request medication refills online. 1. In your internet browser, go to [email or web address] 2. Click on the First Time User? Click Here link in the Sign In box. You will see the New Member Sign Up page. 3. Enter your [employer] Access Code exactly as it appears below. You will not need to use this code after youve completed the sign-up process. If you do not sign up before the expiration date, you must request a new code. · [employer] Access Code: [email or web address] Expires: 10/24/2018  1:29 PM 
 
4.  Enter the last four digits of your Social Security Number (xxxx) and Date of Birth (mm/dd/yyyy) as indicated and click Submit. You will be taken to the next sign-up page. 5. Create a [employer] ID. This will be your [employer] login ID and cannot be changed, so think of one that is secure and easy to remember. 6. Create a [employer] password. You can change your password at any time. 7. Enter your Password Reset Question and Answer. This can be used at a later time if you forget your password. 8. Enter your e-mail address. You will receive e-mail notification when new information is available in [address] 9. Click Sign Up. You can now view and download portions of your medical record. 10. Click the Download Summary menu link to download a portable copy of your medical information. If you have questions, please visit the Frequently Asked Questions section of the [email or web address] website. Remember, [email or web address] is NOT to be used for urgent needs. For medical emergencies, dial 911. Now available from your iPhone and Android! Please provide this summary of care documentation to your next provider. Your primary care clinician is listed as Chris Lorenz. If you have any questions after today's visit, please call [phone].

## 2018-07-26 NOTE — PROGRESS NOTES
The patient is a 66 y.o. woman referred by Harley Lopes MD regarding carotid artery disease. The patient herself is reported to have difficulties with memory and not able to give a clear history. Dr. Kevin Amor note indicates the patient has a history of stroke and has previously had evaluation of the carotid arteries by ultrasound and by CTA. The CTA is reported to have been done at [facility]. According to Dr. Kevin Amor note, the patient had finding of total occlusion of left internal carotid artery and about a 50% diameter reduction of right internal carotid artery. On questioning today, the patient and a caretaker who accompanied her indicate the patient has not had any recent lateralizing neurological symptoms. The patient does have a history of diabetes. She is a former smoker. She is treated for hypertension and hyperlipidemia. The patient denies anginal chest pain, irregular heart beat, shortness of breath at rest or with exertion. The patient is able to walk short distances utilizing a walker. Physical Examination:   Visit Vitals    /89 (BP 1 Location: Right arm, BP Patient Position: Sitting)    Pulse 67    Temp 98.7 °F (37.1 °C)    Ht 5' 9\" (1.753 m)    Wt 105.2 kg (232 lb)    SpO2 96%    BMI 34.26 kg/m2   GENERAL:  Alert overweight woman in no acute distress. HEAD/NECK: No jaundice, mass or thyromegaly. There was a left neck bruit. Temporal pulse was normal on the right and reduced on the left. Carotid pulse was normal on the right and somewhat reduced on the left. I did not see a scan of the patient's CTA report from [facility] or any recent carotid duplex ultrasound examination. I had the patient sign a request for medical records from [facility] for results of CTA of head and neck. Depending on that result, I can determine if a new duplex ultrasound needs to be done now or as a follow up measure later.     If the stated result of the CTA is as reported, with occlusion of left internal carotid artery and 50% diameter reduction of right internal carotid artery, then no surgical intervention would be indicated. I explained to the patient that a totally occluded internal carotid artery cannot be reopened. On the right side, 50% diameter reduction would not warrant any surgical intervention. I have asked the patient or caretaker to call the office in 2 weeks to discuss results evaluation of the CTA and any further recommendations. Most likely she will simply continue with ultrasound surveillance of the carotid arteries. I did recommend the patient be strictly compliant with medications and diet with regard to treatment of her diabetes, hyperlipidemia and hypertension. The patient is presently taking one aspirin per day. She is also taking Plavix. Final Diagnosis:  Carotid artery disease.     cc: Adelaida Raines MD

## 2018-07-26 NOTE — PROGRESS NOTES
1. Have you been to the ER, urgent care clinic since your last visit? Hospitalized since your last visit? New patient2. Have you seen or consulted any other health care providers outside of the [address] [place] [address] since your last visit? Include any pap smears or colon screening. New patient    Discussed advanced directive. Patient states that she does not have an advanced directive.

## 2018-08-13 ENCOUNTER — OFFICE VISIT (OUTPATIENT)
Dept: FAMILY MEDICINE CLINIC | Age: 63
End: 2018-08-13

## 2018-08-13 VITALS
TEMPERATURE: 98.4 F | SYSTOLIC BLOOD PRESSURE: 168 MMHG | HEART RATE: 66 BPM | BODY MASS INDEX: 34.36 KG/M2 | OXYGEN SATURATION: 100 % | HEIGHT: 69 IN | RESPIRATION RATE: 18 BRPM | WEIGHT: 232 LBS | DIASTOLIC BLOOD PRESSURE: 80 MMHG

## 2018-08-13 DIAGNOSIS — E78.5 HYPERLIPIDEMIA, UNSPECIFIED HYPERLIPIDEMIA TYPE: ICD-10-CM

## 2018-08-13 DIAGNOSIS — I25.10 CORONARY ARTERY DISEASE INVOLVING NATIVE HEART WITHOUT ANGINA PECTORIS, UNSPECIFIED VESSEL OR LESION TYPE: ICD-10-CM

## 2018-08-13 DIAGNOSIS — Z86.73 HISTORY OF CVA (CEREBROVASCULAR ACCIDENT): ICD-10-CM

## 2018-08-13 DIAGNOSIS — E66.9 OBESITY (BMI 30-39.9): ICD-10-CM

## 2018-08-13 DIAGNOSIS — I77.9 BILATERAL CAROTID ARTERY DISEASE (HCC): ICD-10-CM

## 2018-08-13 DIAGNOSIS — E11.8 CONTROLLED TYPE 2 DIABETES MELLITUS WITH COMPLICATION, WITHOUT LONG-TERM CURRENT USE OF INSULIN (HCC): ICD-10-CM

## 2018-08-13 DIAGNOSIS — R41.3 MEMORY IMPAIRMENT: ICD-10-CM

## 2018-08-13 DIAGNOSIS — I10 ESSENTIAL HYPERTENSION: Primary | ICD-10-CM

## 2018-08-13 PROBLEM — E78.49 OTHER HYPERLIPIDEMIA: Status: ACTIVE | Noted: 2018-08-13

## 2018-08-13 PROBLEM — E11.9 CONTROLLED TYPE 2 DIABETES MELLITUS, WITHOUT LONG-TERM CURRENT USE OF INSULIN (HCC): Status: ACTIVE | Noted: 2018-08-13

## 2018-08-13 RX ORDER — ATORVASTATIN CALCIUM 20 MG/1
40 TABLET, FILM COATED ORAL DAILY
Qty: 30 TAB | Refills: 6 | Status: SHIPPED | OUTPATIENT
Start: 2018-08-13 | End: 2018-08-17 | Stop reason: SDUPTHER

## 2018-08-13 RX ORDER — LISINOPRIL 20 MG/1
40 TABLET ORAL DAILY
Qty: 30 TAB | Refills: 5 | Status: SHIPPED | OUTPATIENT
Start: 2018-08-13 | End: 2018-08-17 | Stop reason: SDUPTHER

## 2018-08-13 NOTE — PROGRESS NOTES
[address] [facility] [address]   Office [phone], Fax [phone]      Subjective:     CC: Routine follow up   History provided by patient and friend    HPI:  Lukas Meng is a 66 y.o. WHITE OR  female with significant history of HTN, HLD, Type 2 diabetes, CAD, CVA and bilateral carotid artery disease presenting for routine care follow up. Since her last visit on 06/05, she was seen by neurology - Dr. David Hernández who had referred her to Neuropsych for memory testing. She was also seen by Vascular surgery who's awating results of CTA from [facility] and to determine for indication of carotid duplex ultrasound. Today, patient reports she's doing well. She denies any falls, focal weakness, dizziness, chest pain or shortness of breath. Reports that she has no means of income and unable to buy basic medical supplies including BP cuff or glucometer to check her sugars. She is accompanied by her friend who's helping her with organizing and scheduling her medical appointments. Reports that she has an appointment with Neuropsych in February. In the process of making an appointment with Ophthalmology. Also applying for disability at this time. Patient quit smoking 3 months ago. Review of Systems   Constitutional: Negative for chills and fever. Eyes: Negative for pain. Respiratory: Negative for cough and shortness of breath. Cardiovascular: Negative for chest pain, palpitations and leg swelling. Gastrointestinal: Negative for abdominal pain, blood in stool, nausea and vomiting. Genitourinary: Negative for dysuria. Musculoskeletal: Negative for falls. Skin: Negative for rash. Neurological: Positive for weakness. Negative for focal weakness and loss of consciousness.        Past Medical History:   Diagnosis Date    Depression     Diverticulitis 3/2013    DVT (deep venous thrombosis) (HCC)     Hirsutism     HTN (hypertension)     Hypercholesterolemia     Stroke [facility])     Type II or unspecified type diabetes mellitus without mention of complication, not stated as uncontrolled     Virilization [facility])        Current Outpatient Prescriptions on File Prior to Visit   Medication Sig Dispense Refill    clopidogrel (PLAVIX) 75 mg tab TAKE ONE TABLET BY MOUTH ONE TIME DAILY  6    glipiZIDE (GLUCOTROL) 10 mg tablet TAKE ONE TABLET BY MOUTH ONE TIME DAILY  6    metoprolol succinate (TOPROL-XL) 50 mg XL tablet TAKE ONE TABLET BY MOUTH ONE TIME DAILY  5    aspirin (ASPIRIN) 325 mg tablet Take 1 Tab by mouth daily. Indications: Cerebral Thromboembolism Prevention 1 Tab 0    PREGABALIN (LYRICA PO) Take  by mouth. No current facility-administered medications on file prior to visit. Allergies   Allergen Reactions    Pcn [Penicillins] Rash    Tramadol Nausea and Vomiting       Past Surgical History:   Procedure Laterality Date    HX APPENDECTOMY  1969    HX OTHER SURGICAL      ovaries removed        Social History     Social History    Marital status:      Spouse name: N/A    Number of children: N/A    Years of education: N/A     Occupational History    Not on file.      Social History Main Topics    Smoking status: Former Smoker     Quit date: 4/26/2018    Smokeless tobacco: Never Used      Comment: 01/01/2018    Alcohol use No    Drug use: Yes     Special: Marijuana    Sexual activity: Not on file     Other Topics Concern    Not on file     Social History Narrative       Patient Active Problem List   Diagnosis Code    Hirsutism L68.0    Virilization ([facility] E25.9    [facility] ([facility] I77.9         Objective:   Vitals - reviewed  Visit Vitals    /80 (BP 1 Location: Right arm, BP Patient Position: Sitting)    Pulse 66    Temp 98.4 °F (36.9 °C) (Oral)    Resp 18    Ht 5' 9\" (1.753 m)    Wt 232 lb (105.2 kg)    SpO2 100%    BMI 34.26 kg/m2        Physical Exam   Constitutional: She is oriented to person, place, and time. She appears well-developed and well-nourished. Uses roller walker to ambulate    HENT:   Head: Normocephalic and atraumatic. Eyes: Conjunctivae and EOM are normal. Pupils are equal, round, and reactive to light. Neck: Normal range of motion. Cardiovascular: Normal rate and regular rhythm. Pulmonary/Chest: Breath sounds normal. No respiratory distress. She has no rales. Musculoskeletal: She exhibits no tenderness. Slowed gait   Neurological: She is alert and oriented to person, place, and time. No cranial nerve deficit. Gait abnormal.   Strength 4/5 in upper extremities bilaterally. 5/5 in lower extremities bilaterally    Psychiatric: She has a normal mood and affect. Assessment and orders:   Diagnoses and all orders for this visit:    1. Essential hypertension: BP not well controlled. Patient on lisinopril and Metoprolol. Does not check BP at home. /93 and repeat manual /80.   -     Increased lisinopril from 20 mg to 40 mg PO daily. Advised on low salt diet and light exercises given risk for fall.   -     METABOLIC PANEL, COMPREHENSIVE  -     AMB POC URINE, MICROALBUMIN, SEMIQUANT (3 RESULTS)    2. Coronary artery disease involving native heart without angina pectoris, unspecified vessel or lesion type: Hx of MI in the past. Cardiac cath on 04/2016 at [facility]. s/p PTCA of PDA opening vessel with good blood flow. Patient lost to follow up with cardiology due to insurance purposes. No recent chest pain. Referral to cardiology for continuation of care. -     REFERRAL TO CARDIOLOGY    3. Controlled type 2 diabetes mellitus with complication, without long-term current use of insulin ([facility]): Last A1c 6.8 on 04/2018 from records received from PCP. Currently on Glipizide. Does not check her BG at home as she has no means of income and unable to buy glucometer. Discussed about reduced prices of OTC glucometer. In the process of applying for disability. Will check for A1C. Follow up with Ophthalmology   -     HEMOGLOBIN A1C WITH EAG  -     AMB POC URINE, MICROALBUMIN, SEMIQUANT (3 RESULTS)    4. Hyperlipidemia, unspecified hyperlipidemia type: Recent lipid panel on 04/2018 per records from previous PCP ( Dr. Dwight Ramirez): Total Cholesterol 163, , HDL 54, LDL 80       -      Increased Lipitor from 20 mg to 40 mg PO daily  -     Repeat LIPID PANEL when patient is fasting. 5. Memory Impairment: Patient follow up with Neurology - Dr. Sera Osullivan who  recommended for evaluation by Neuropsych. Patient initially had appointment in February but was able to get closer appointment in October. Follow up with Neurology as indicated    6. Bilateral Carotid artery disease: Following up with vascular surgery- Dr. Kathleen Ko. Follow up as indicated. 7. Obesity with BMI of 34.26: Patient with sedentary life style due to generalized weakness. History of Snoring at night time. Referral to sleep medicine for evaluation of KARISHMA recommended by Neurology that I also agree with. Patient declines at this time as she wants to optimize her current medical conditions and to make an appointment with sleep medicine sometime in the future. Other orders  -     lisinopril (PRINIVIL, ZESTRIL) 20 mg tablet; Take 2 Tabs by mouth daily. -     atorvastatin (LIPITOR) 20 mg tablet; Take 2 Tabs by mouth daily. Advised patient to make medication only appointment to go over all her medication list and offer coupons on Good Rx at eligible pharmacy to cut down her cost of medication between $5 -$15 for 3 months supply. Schedule an appointment for yearly physical.         Pt was discussed with Dr. Ed Lowry (attending physician). I have reviewed patient medical and social history and medications. I have reviewed pertinent labs results and other data. I have discussed the diagnosis with the patient and the intended plan as seen in the above orders.  The patient has received an after-visit summary and questions were answered concerning future plans. I have discussed medication side effects and warnings with the patient as well.     Inge Hines MD  Resident [facility]  08/13/18

## 2018-08-13 NOTE — PATIENT INSTRUCTIONS
Follow up with Cardiology    Follow up with Neruopsychiatry    Follow up with ophthalmology    Increase dose of lisinopril 40 mg mouth daily     Increase dose of Lipitor 40 mg by mouth daily

## 2018-08-13 NOTE — PROGRESS NOTES
Identified Patient with two Patient identifiers (Name and ). Two Patient Identifiers confirmed. Reviewed record in preparation for visit and have obtained necessary documentation. Chief Complaint   Patient presents with    Follow-up     CVA and heart attack       Vitals:    18 1132 18 1142 18 1212 18 1216   BP: (!) 141/112 (!) 156/93 180/79 168/80   BP 1 Location: Right arm Right arm Right arm Right arm   BP Patient Position: Sitting Sitting Sitting Sitting   Pulse: 66 66     Resp: 18      Temp: 98.4 °F (36.9 °C)      TempSrc: Oral      SpO2: 100%      Weight: 232 lb (105.2 kg)      Height: 5' 9\" (1.753 m)            1. Have you been to the ER, urgent care clinic since your last visit? Hospitalized since your last visit? No    2. Have you seen or consulted any other health care providers outside of the [address] [place] [address] since your last visit? Include any pap smears or colon screening. No    Patient and family member asked me in the room if we could try to get her Neuro Psych appointment moved sooner. Her original appointment was with Dr. Sade Alvarez 2019. I called and had it rescheduled to 2018 with the same doctor at the [place] location.

## 2018-08-13 NOTE — MR AVS SNAPSHOT
[address] 
[phone] Patient: Jacky Joseph MRN: [id number] : Visit Information Date & Time Provider Department Dept. Phone Encounter #  
 2018 11:15 AM Yann Maxwell MD [address] [place] [address] [phone] [id number] Follow-up Instructions Return if symptoms worsen or fail to improve, for Please make an appointment for medication only appointment . Your Appointments 10/2/2018  3:00 PM  
Follow Up with Ana Fermin PsyD  [facility]) Appt Note: neuro sight [place] [address] [address] [phone]  
  
   
 [place] [address]  
  
    
 2019  1:00 PM  
New Patient with Ana Fermin PsyD  [facility] ([facility]) Appt Note: new patient memory impairment/ Tevin Salima [place] [address] [address] [phone]  
  
   
 [place] [address] [facility] [address] [address] [place] Upcoming Health Maintenance Date Due Hepatitis C Screening 1957 DTaP/Tdap/Td series (1 - Tdap) 1978 PAP AKA CERVICAL CYTOLOGY 1978 BREAST CANCER SCRN MAMMOGRAM 2007 FOBT Q 1 YEAR AGE 50-75 2007 ZOSTER VACCINE AGE 60> 2017 Influenza Age 5 to Adult 2018 Allergies as of 2018  Review Complete On: 2018 By: Tori Severs, LPN Severity Noted Reaction Type Reactions Pcn [Penicillins]  2014    Rash Tramadol  2014    Nausea and Vomiting Current Immunizations  Never Reviewed No immunizations on file. Not reviewed this visit You Were Diagnosed With   
  
 Codes Comments Essential hypertension    -  Primary ICD-10-CM: I10 
ICD-9-CM: 401.9  Coronary artery disease involving native heart without angina pectoris, unspecified vessel or lesion type     ICD-10-CM: I25.10 ICD-9-CM: 414.01 Controlled type 2 diabetes mellitus with complication, without long-term current use of insulin (HCC)     ICD-10-CM: E11.8 ICD-9-CM: 250.90 Hyperlipidemia, unspecified hyperlipidemia type     ICD-10-CM: E78.5 ICD-9-CM: 272.4 Vitals BP Pulse Temp Resp Height(growth percentile) Weight(growth percentile) 168/80 (BP 1 Location: Right arm, BP Patient Position: Sitting) 66 98.4 °F (36.9 °C) (Oral) 18 5' 9\" (1.753 m) 232 lb (105.2 kg) SpO2 BMI OB Status Smoking Status 100% 34.26 kg/m2 Postmenopausal Former Smoker Vitals History BMI and BSA Data Body Mass Index Body Surface Area  
 34.26 kg/m 2 2.26 m 2 Preferred Pharmacy Pharmacy Name Phone [address], [address] [place] [address] [phone] Your Updated Medication List  
  
   
This list is accurate as of 8/13/18 12:22 PM.  Always use your most recent med list.  
  
  
  
  
 aspirin 325 mg tablet Commonly known as:  ASPIRIN Take 1 Tab by mouth daily. Indications: Cerebral Thromboembolism Prevention  
  
 atorvastatin 20 mg tablet Commonly known as:  LIPITOR Take 2 Tabs by mouth daily. clopidogrel 75 mg Tab Commonly known as:  PLAVIX TAKE ONE TABLET BY MOUTH ONE TIME DAILY  
  
 GABAPENTIN PO Take  by mouth. glipiZIDE 10 mg tablet Commonly known as:  GLUCOTROL  
TAKE ONE TABLET BY MOUTH ONE TIME DAILY  
  
 GLYBURIDE PO Take  by mouth. JANUVIA 100 mg tablet Generic drug:  SITagliptin Take 100 mg by mouth daily. lisinopril 20 mg tablet Commonly known as:  Ruth Shames Take 2 Tabs by mouth daily. LYRICA PO Take  by mouth.  
  
 metoprolol succinate 50 mg XL tablet Commonly known as:  TOPROL-XL  
TAKE ONE TABLET BY MOUTH ONE TIME DAILY NADOLOL PO Take  by mouth.   
  
 TYLENOL PM PO Take  by mouth. Prescriptions Sent to Pharmacy Refills  
 lisinopril (PRINIVIL, ZESTRIL) 20 mg tablet 5 Sig: Take 2 Tabs by mouth daily. Class: Normal  
 Pharmacy: [address] Ph #: [phone] Route: Oral  
 atorvastatin (LIPITOR) 20 mg tablet 6 Sig: Take 2 Tabs by mouth daily. Class: Normal  
 Pharmacy: [address] Ph #: [phone] Route: Oral  
  
We Performed the Following AMB POC URINE, MICROALBUMIN, SEMIQUANT (3 RESULTS) [88143 CPT(R)] CBC WITH AUTOMATED DIFF [64131 CPT(R)] HEMOGLOBIN A1C WITH EAG [98544 CPT(R)] LIPID PANEL [98529 CPT(R)] METABOLIC PANEL, COMPREHENSIVE [62291 CPT(R)] REFERRAL TO CARDIOLOGY [SRY57 Custom] Comments: Hx of CAD with stent. Lost to follow up. Establish cardiology care Follow-up Instructions Return if symptoms worsen or fail to improve, for Please make an appointment for medication only appointment . Referral Information Referral ID Referred By Referred To  
  
 [id number] [place], [address] [place] [address] [address] [place], [address] [place] [address] Phone: [phone] Fax: [phone] Visits Status Start Date End Date 1 New Request 8/13/18 8/13/19 If your referral has a status of pending review or denied, additional information will be sent to support the outcome of this decision. Patient Instructions Follow up with Cardiology Follow up with Neruopsychiatry Follow up with ophthalmology Increase dose of lisinopril 40 mg mouth daily Increase dose of Lipitor 40 mg by mouth daily Introducing [facility]! [employer] introduces doggyloot patient portal. Now you can access parts of your medical record, email your doctor's office, and request medication refills online.    
 
1. In your internet browser, go to [email or web address] 2. Click on the First Time User? Click Here link in the Sign In box. You will see the New Member Sign Up page. 3. Enter your DigiPath Access Code exactly as it appears below. You will not need to use this code after youve completed the sign-up process. If you do not sign up before the expiration date, you must request a new code. · DigiPath Access Code: [email or web address] Expires: 10/24/2018  1:29 PM 
 
4. Enter the last four digits of your Social Security Number (xxxx) and Date of Birth (mm/dd/yyyy) as indicated and click Submit. You will be taken to the next sign-up page. 5. Create a Training Intelligencet ID. This will be your DigiPath login ID and cannot be changed, so think of one that is secure and easy to remember. 6. Create a DigiPath password. You can change your password at any time. 7. Enter your Password Reset Question and Answer. This can be used at a later time if you forget your password. 8. Enter your e-mail address. You will receive e-mail notification when new information is available in [address] 9. Click Sign Up. You can now view and download portions of your medical record. 10. Click the Download Summary menu link to download a portable copy of your medical information. If you have questions, please visit the Frequently Asked Questions section of the DigiPath website. Remember, DigiPath is NOT to be used for urgent needs. For medical emergencies, dial 911. Now available from your iPhone and Android! Please provide this summary of care documentation to your next provider. Your primary care clinician is listed as Chris Lorenz. If you have any questions after today's visit, please call [phone].

## 2018-08-14 ENCOUNTER — TELEPHONE (OUTPATIENT)
Dept: FAMILY MEDICINE CLINIC | Age: 63
End: 2018-08-14

## 2018-08-14 NOTE — TELEPHONE ENCOUNTER
Dr. Estela Shafer with pharmacist Rui Sep regarding clarification needed for Lisinopril and Lipitor sent to pharmacy.
Per call from Cony Monae with [facility], medications were each written to take 2 pills a day and in the past patient have taken 1 pill a day. Asking that directions be clarified.     Atorvastatin 20 mg and lisinopril 20 mg
Spouse/Significant other

## 2018-08-17 ENCOUNTER — LAB ONLY (OUTPATIENT)
Dept: FAMILY MEDICINE CLINIC | Age: 63
End: 2018-08-17

## 2018-08-17 DIAGNOSIS — I10 ESSENTIAL HYPERTENSION: Primary | ICD-10-CM

## 2018-08-17 LAB
ALBUMIN UR QL STRIP: 150 MG/L
CREATININE, URINE POC: 200 MG/DL
MICROALBUMIN/CREAT RATIO POC: >300 MG/G

## 2018-08-17 RX ORDER — ATORVASTATIN CALCIUM 20 MG/1
40 TABLET, FILM COATED ORAL DAILY
Qty: 30 TAB | Refills: 6 | Status: SHIPPED | OUTPATIENT
Start: 2018-08-17 | End: 2019-04-15 | Stop reason: DRUGHIGH

## 2018-08-17 RX ORDER — LISINOPRIL 20 MG/1
40 TABLET ORAL DAILY
Qty: 30 TAB | Refills: 5 | Status: SHIPPED | OUTPATIENT
Start: 2018-08-17 | End: 2019-02-13 | Stop reason: DRUGHIGH

## 2018-08-17 NOTE — TELEPHONE ENCOUNTER
Aleyda Mckinney, came in with patient stated that in the last appt you discussed doubling up on her medications, she is almost out.

## 2018-08-18 LAB
ALBUMIN SERPL-MCNC: 4.3 G/DL (ref 3.6–4.8)
ALBUMIN/GLOB SERPL: 1.3 {RATIO} (ref 1.2–2.2)
ALP SERPL-CCNC: 114 IU/L (ref 39–117)
ALT SERPL-CCNC: 15 IU/L (ref 0–32)
AST SERPL-CCNC: 14 IU/L (ref 0–40)
BASOPHILS # BLD AUTO: 0.1 X10E3/UL (ref 0–0.2)
BASOPHILS NFR BLD AUTO: 1 %
BILIRUB SERPL-MCNC: 0.3 MG/DL (ref 0–1.2)
BUN SERPL-MCNC: 16 MG/DL (ref 8–27)
BUN/CREAT SERPL: 16 (ref 12–28)
CALCIUM SERPL-MCNC: 9.9 MG/DL (ref 8.7–10.3)
CHLORIDE SERPL-SCNC: 98 MMOL/L (ref 96–106)
CHOLEST SERPL-MCNC: 151 MG/DL (ref 100–199)
CO2 SERPL-SCNC: 19 MMOL/L (ref 20–29)
CREAT SERPL-MCNC: 1 MG/DL (ref 0.57–1)
EOSINOPHIL # BLD AUTO: 0.1 X10E3/UL (ref 0–0.4)
EOSINOPHIL NFR BLD AUTO: 1 %
ERYTHROCYTE [DISTWIDTH] IN BLOOD BY AUTOMATED COUNT: 13.6 % (ref 12.3–15.4)
EST. AVERAGE GLUCOSE BLD GHB EST-MCNC: 131 MG/DL
GLOBULIN SER CALC-MCNC: 3.4 G/DL (ref 1.5–4.5)
GLUCOSE SERPL-MCNC: 165 MG/DL (ref 65–99)
HBA1C MFR BLD: 6.2 % (ref 4.8–5.6)
HCT VFR BLD AUTO: 43.4 % (ref 34–46.6)
HDLC SERPL-MCNC: 48 MG/DL
HGB BLD-MCNC: 14.5 G/DL (ref 11.1–15.9)
IMM GRANULOCYTES # BLD: 0 X10E3/UL (ref 0–0.1)
IMM GRANULOCYTES NFR BLD: 0 %
INTERPRETATION, 910389: NORMAL
LDLC SERPL CALC-MCNC: 77 MG/DL (ref 0–99)
LYMPHOCYTES # BLD AUTO: 1.6 X10E3/UL (ref 0.7–3.1)
LYMPHOCYTES NFR BLD AUTO: 17 %
Lab: NORMAL
MCH RBC QN AUTO: 31.4 PG (ref 26.6–33)
MCHC RBC AUTO-ENTMCNC: 33.4 G/DL (ref 31.5–35.7)
MCV RBC AUTO: 94 FL (ref 79–97)
MONOCYTES # BLD AUTO: 0.6 X10E3/UL (ref 0.1–0.9)
MONOCYTES NFR BLD AUTO: 6 %
NEUTROPHILS # BLD AUTO: 7.4 X10E3/UL (ref 1.4–7)
NEUTROPHILS NFR BLD AUTO: 75 %
PLATELET # BLD AUTO: 362 X10E3/UL (ref 150–379)
POTASSIUM SERPL-SCNC: 4.9 MMOL/L (ref 3.5–5.2)
PROT SERPL-MCNC: 7.7 G/DL (ref 6–8.5)
RBC # BLD AUTO: 4.62 X10E6/UL (ref 3.77–5.28)
SODIUM SERPL-SCNC: 138 MMOL/L (ref 134–144)
TRIGL SERPL-MCNC: 130 MG/DL (ref 0–149)
VLDLC SERPL CALC-MCNC: 26 MG/DL (ref 5–40)
WBC # BLD AUTO: 9.8 X10E3/UL (ref 3.4–10.8)

## 2018-08-18 NOTE — PROGRESS NOTES
I reviewed with the resident the patient's medical history and the resident's history and findings on the physical examination. I discussed assessment and plan with the resident and concur with the findings and plan as documented in the resident's note.

## 2018-08-20 ENCOUNTER — TELEPHONE (OUTPATIENT)
Dept: SURGERY | Age: 63
End: 2018-08-20

## 2018-08-20 NOTE — TELEPHONE ENCOUNTER
Surgery  -  Late entry    I spoke to the patient by phone on 8/2/2018 regarding results of my review of data from [facility].  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 She should have follow up carotid duplex scan in January 2019, which can be arranged through my office.     Elisha May MD

## 2018-08-21 ENCOUNTER — DOCUMENTATION ONLY (OUTPATIENT)
Dept: SURGERY | Age: 63
End: 2018-08-21

## 2018-08-21 NOTE — PROGRESS NOTES
Faxed telephone encounter dated 8/20/18 & CTA of neck from [facility] dated 1/25/18 to Dr Williams Wilson at (f) [phone], confirmation received.

## 2019-01-01 ENCOUNTER — HOME CARE VISIT (OUTPATIENT)
Dept: SCHEDULING | Facility: HOME HEALTH | Age: 64
End: 2019-01-01
Payer: MEDICAID

## 2019-01-01 ENCOUNTER — TELEPHONE (OUTPATIENT)
Dept: FAMILY MEDICINE CLINIC | Age: 64
End: 2019-01-01

## 2019-01-01 ENCOUNTER — HOME CARE VISIT (OUTPATIENT)
Dept: HOME HEALTH SERVICES | Facility: HOME HEALTH | Age: 64
End: 2019-01-01
Payer: MEDICAID

## 2019-01-01 ENCOUNTER — OFFICE VISIT (OUTPATIENT)
Dept: FAMILY MEDICINE CLINIC | Age: 64
End: 2019-01-01

## 2019-01-01 ENCOUNTER — TELEPHONE (OUTPATIENT)
Dept: NEUROLOGY | Age: 64
End: 2019-01-01

## 2019-01-01 ENCOUNTER — ED HISTORICAL/CONVERTED ENCOUNTER (OUTPATIENT)
Dept: OTHER | Age: 64
End: 2019-01-01

## 2019-01-01 ENCOUNTER — OFFICE VISIT (OUTPATIENT)
Dept: NEUROLOGY | Age: 64
End: 2019-01-01

## 2019-01-01 ENCOUNTER — DOCUMENTATION ONLY (OUTPATIENT)
Dept: FAMILY MEDICINE CLINIC | Age: 64
End: 2019-01-01

## 2019-01-01 VITALS
RESPIRATION RATE: 16 BRPM | DIASTOLIC BLOOD PRESSURE: 88 MMHG | TEMPERATURE: 98.4 F | HEART RATE: 60 BPM | SYSTOLIC BLOOD PRESSURE: 168 MMHG | OXYGEN SATURATION: 95 %

## 2019-01-01 VITALS
RESPIRATION RATE: 20 BRPM | DIASTOLIC BLOOD PRESSURE: 94 MMHG | SYSTOLIC BLOOD PRESSURE: 157 MMHG | TEMPERATURE: 97.8 F | OXYGEN SATURATION: 96 % | HEART RATE: 58 BPM

## 2019-01-01 VITALS
SYSTOLIC BLOOD PRESSURE: 138 MMHG | RESPIRATION RATE: 18 BRPM | HEART RATE: 55 BPM | DIASTOLIC BLOOD PRESSURE: 80 MMHG | TEMPERATURE: 97.7 F | OXYGEN SATURATION: 97 %

## 2019-01-01 VITALS
TEMPERATURE: 97.7 F | RESPIRATION RATE: 18 BRPM | SYSTOLIC BLOOD PRESSURE: 138 MMHG | OXYGEN SATURATION: 97 % | DIASTOLIC BLOOD PRESSURE: 80 MMHG | HEART RATE: 56 BPM

## 2019-01-01 VITALS
HEIGHT: 69 IN | BODY MASS INDEX: 34.26 KG/M2 | HEART RATE: 58 BPM | SYSTOLIC BLOOD PRESSURE: 127 MMHG | OXYGEN SATURATION: 95 % | TEMPERATURE: 98.2 F | DIASTOLIC BLOOD PRESSURE: 81 MMHG

## 2019-01-01 VITALS
TEMPERATURE: 98.7 F | SYSTOLIC BLOOD PRESSURE: 148 MMHG | OXYGEN SATURATION: 96 % | DIASTOLIC BLOOD PRESSURE: 82 MMHG | HEART RATE: 57 BPM | RESPIRATION RATE: 18 BRPM

## 2019-01-01 VITALS
TEMPERATURE: 97.9 F | RESPIRATION RATE: 18 BRPM | HEART RATE: 62 BPM | SYSTOLIC BLOOD PRESSURE: 140 MMHG | OXYGEN SATURATION: 96 % | DIASTOLIC BLOOD PRESSURE: 80 MMHG

## 2019-01-01 VITALS
HEART RATE: 55 BPM | DIASTOLIC BLOOD PRESSURE: 89 MMHG | TEMPERATURE: 98 F | RESPIRATION RATE: 20 BRPM | SYSTOLIC BLOOD PRESSURE: 166 MMHG | OXYGEN SATURATION: 98 %

## 2019-01-01 VITALS
HEART RATE: 62 BPM | RESPIRATION RATE: 18 BRPM | OXYGEN SATURATION: 95 % | DIASTOLIC BLOOD PRESSURE: 90 MMHG | TEMPERATURE: 97.6 F | SYSTOLIC BLOOD PRESSURE: 158 MMHG

## 2019-01-01 VITALS
HEART RATE: 56 BPM | RESPIRATION RATE: 18 BRPM | DIASTOLIC BLOOD PRESSURE: 80 MMHG | OXYGEN SATURATION: 97 % | SYSTOLIC BLOOD PRESSURE: 138 MMHG | TEMPERATURE: 97.7 F

## 2019-01-01 VITALS
DIASTOLIC BLOOD PRESSURE: 86 MMHG | SYSTOLIC BLOOD PRESSURE: 174 MMHG | TEMPERATURE: 98.2 F | RESPIRATION RATE: 18 BRPM | OXYGEN SATURATION: 97 % | HEART RATE: 56 BPM

## 2019-01-01 VITALS
OXYGEN SATURATION: 97 % | HEART RATE: 64 BPM | TEMPERATURE: 98 F | DIASTOLIC BLOOD PRESSURE: 80 MMHG | RESPIRATION RATE: 18 BRPM | SYSTOLIC BLOOD PRESSURE: 142 MMHG

## 2019-01-01 VITALS
DIASTOLIC BLOOD PRESSURE: 84 MMHG | HEART RATE: 60 BPM | OXYGEN SATURATION: 97 % | TEMPERATURE: 98.1 F | RESPIRATION RATE: 18 BRPM | SYSTOLIC BLOOD PRESSURE: 160 MMHG

## 2019-01-01 VITALS
SYSTOLIC BLOOD PRESSURE: 108 MMHG | HEART RATE: 54 BPM | OXYGEN SATURATION: 97 % | TEMPERATURE: 97 F | RESPIRATION RATE: 18 BRPM | DIASTOLIC BLOOD PRESSURE: 80 MMHG

## 2019-01-01 VITALS
RESPIRATION RATE: 16 BRPM | TEMPERATURE: 98.4 F | OXYGEN SATURATION: 95 % | HEART RATE: 60 BPM | DIASTOLIC BLOOD PRESSURE: 88 MMHG | SYSTOLIC BLOOD PRESSURE: 168 MMHG

## 2019-01-01 VITALS
OXYGEN SATURATION: 97 % | TEMPERATURE: 97.3 F | RESPIRATION RATE: 18 BRPM | HEART RATE: 73 BPM | DIASTOLIC BLOOD PRESSURE: 80 MMHG | SYSTOLIC BLOOD PRESSURE: 120 MMHG

## 2019-01-01 VITALS
OXYGEN SATURATION: 96 % | SYSTOLIC BLOOD PRESSURE: 120 MMHG | TEMPERATURE: 97.7 F | DIASTOLIC BLOOD PRESSURE: 80 MMHG | RESPIRATION RATE: 18 BRPM | HEART RATE: 56 BPM

## 2019-01-01 VITALS
OXYGEN SATURATION: 95 % | HEART RATE: 58 BPM | SYSTOLIC BLOOD PRESSURE: 126 MMHG | TEMPERATURE: 97.8 F | DIASTOLIC BLOOD PRESSURE: 60 MMHG

## 2019-01-01 VITALS — BODY MASS INDEX: 34.26 KG/M2 | OXYGEN SATURATION: 97 % | HEIGHT: 69 IN | RESPIRATION RATE: 16 BRPM

## 2019-01-01 VITALS
DIASTOLIC BLOOD PRESSURE: 80 MMHG | RESPIRATION RATE: 18 BRPM | OXYGEN SATURATION: 97 % | SYSTOLIC BLOOD PRESSURE: 120 MMHG | TEMPERATURE: 97.3 F | HEART RATE: 73 BPM

## 2019-01-01 DIAGNOSIS — F32.A ANXIETY AND DEPRESSION: Primary | ICD-10-CM

## 2019-01-01 DIAGNOSIS — G62.9 NEUROPATHY: ICD-10-CM

## 2019-01-01 DIAGNOSIS — R53.81 DEBILITY: ICD-10-CM

## 2019-01-01 DIAGNOSIS — R41.3 MEMORY IMPAIRMENT: ICD-10-CM

## 2019-01-01 DIAGNOSIS — F32.A ANXIETY AND DEPRESSION: ICD-10-CM

## 2019-01-01 DIAGNOSIS — F41.9 ANXIETY AND DEPRESSION: Primary | ICD-10-CM

## 2019-01-01 DIAGNOSIS — F41.9 ANXIETY AND DEPRESSION: ICD-10-CM

## 2019-01-01 DIAGNOSIS — F03.90 DEMENTIA WITHOUT BEHAVIORAL DISTURBANCE, UNSPECIFIED DEMENTIA TYPE: Primary | ICD-10-CM

## 2019-01-01 PROCEDURE — G0151 HHCP-SERV OF PT,EA 15 MIN: HCPCS

## 2019-01-01 PROCEDURE — G0157 HHC PT ASSISTANT EA 15: HCPCS

## 2019-01-01 PROCEDURE — G0152 HHCP-SERV OF OT,EA 15 MIN: HCPCS

## 2019-01-01 PROCEDURE — G0153 HHCP-SVS OF S/L PATH,EA 15MN: HCPCS

## 2019-01-01 RX ORDER — DONEPEZIL HYDROCHLORIDE 10 MG/1
10 TABLET, FILM COATED ORAL
Qty: 30 TAB | Refills: 6 | Status: SHIPPED | OUTPATIENT
Start: 2019-01-01

## 2019-01-01 RX ORDER — GABAPENTIN 100 MG/1
CAPSULE ORAL
Qty: 90 CAP | Refills: 1 | Status: SHIPPED | OUTPATIENT
Start: 2019-01-01

## 2019-01-01 RX ORDER — ATORVASTATIN CALCIUM 40 MG/1
TABLET, FILM COATED ORAL
Qty: 90 TAB | Refills: 2 | Status: SHIPPED | OUTPATIENT
Start: 2019-01-01

## 2019-01-01 RX ORDER — SERTRALINE HYDROCHLORIDE 100 MG/1
100 TABLET, FILM COATED ORAL DAILY
Qty: 30 TAB | Refills: 1 | Status: SHIPPED | OUTPATIENT
Start: 2019-01-01

## 2019-01-01 RX ORDER — DONEPEZIL HYDROCHLORIDE 5 MG/1
5 TABLET, FILM COATED ORAL DAILY
Qty: 30 TAB | Refills: 0 | Status: SHIPPED | OUTPATIENT
Start: 2019-01-01

## 2019-01-01 RX ORDER — LISINOPRIL 40 MG/1
TABLET ORAL
Qty: 90 TAB | Refills: 1 | Status: SHIPPED | OUTPATIENT
Start: 2019-01-01

## 2019-01-01 RX ORDER — LANCETS
EACH MISCELLANEOUS
Qty: 1 EACH | Refills: 11 | Status: SHIPPED | OUTPATIENT
Start: 2019-01-01

## 2019-01-22 ENCOUNTER — TELEPHONE (OUTPATIENT)
Dept: SURGERY | Age: 64
End: 2019-01-22

## 2019-01-22 NOTE — TELEPHONE ENCOUNTER
Spoke with Ms Caputo's friend listed on HIPPA. Pt is due for carotid duplex this month. Ms Taran Villeda states care card has  as of 18, [facility] Ms Taran Villeda would like to reapply for care card for pt before scheduling any tests due to pt has no income at this time. Care card phone number given to Ms Taran Villeda, she will call the office when able to schedule carotid duplex.

## 2019-02-15 ENCOUNTER — OFFICE VISIT (OUTPATIENT)
Dept: NEUROLOGY | Age: 64
End: 2019-02-15

## 2019-02-15 DIAGNOSIS — I69.319 COGNITIVE DEFICIT, POST-STROKE: ICD-10-CM

## 2019-02-15 DIAGNOSIS — G31.84 MILD COGNITIVE IMPAIRMENT: Primary | ICD-10-CM

## 2019-02-15 DIAGNOSIS — F41.9 ANXIETY AND DEPRESSION: ICD-10-CM

## 2019-02-15 DIAGNOSIS — R41.9 DEFICIT IN COMPREHENSION: ICD-10-CM

## 2019-02-15 DIAGNOSIS — R41.3 MEMORY CHANGES: ICD-10-CM

## 2019-02-15 DIAGNOSIS — F32.A ANXIETY AND DEPRESSION: ICD-10-CM

## 2019-02-15 DIAGNOSIS — R41.89 DIFFICULTY PROCESSING INFORMATION: ICD-10-CM

## 2019-02-15 DIAGNOSIS — R47.89 WORD FINDING DIFFICULTY: ICD-10-CM
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
## 2019-03-05 ENCOUNTER — OFFICE VISIT (OUTPATIENT)
Dept: NEUROLOGY | Age: 64
End: 2019-03-05

## 2019-03-05 DIAGNOSIS — F32.A ANXIETY AND DEPRESSION: ICD-10-CM

## 2019-03-05 DIAGNOSIS — Z86.73 HISTORY OF CVA (CEREBROVASCULAR ACCIDENT): ICD-10-CM

## 2019-03-05 DIAGNOSIS — F41.9 ANXIETY AND DEPRESSION: ICD-10-CM

## 2019-03-05 DIAGNOSIS — F01.50 VASCULAR DEMENTIA WITHOUT BEHAVIORAL DISTURBANCE (HCC): Primary | ICD-10-CM

## 2019-03-05 DIAGNOSIS — I69.319 COGNITIVE DEFICIT, POST-STROKE: ICD-10-CM
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
## 2019-04-15 RX ORDER — ATORVASTATIN CALCIUM 40 MG/1
TABLET, FILM COATED ORAL
Qty: 30 TAB | Refills: 6 | Status: SHIPPED | OUTPATIENT
Start: 2019-04-15 | End: 2019-01-01 | Stop reason: SDUPTHER

## 2019-04-30 ENCOUNTER — OFFICE VISIT (OUTPATIENT)
Dept: NEUROLOGY | Age: 64
End: 2019-04-30

## 2019-04-30 DIAGNOSIS — Z86.73 HISTORY OF CVA (CEREBROVASCULAR ACCIDENT): ICD-10-CM

## 2019-04-30 DIAGNOSIS — F32.A ANXIETY AND DEPRESSION: ICD-10-CM

## 2019-04-30 DIAGNOSIS — I69.319 COGNITIVE DEFICIT, POST-STROKE: ICD-10-CM

## 2019-04-30 DIAGNOSIS — F41.9 ANXIETY AND DEPRESSION: ICD-10-CM

## 2019-04-30 DIAGNOSIS — F01.50 VASCULAR DEMENTIA WITHOUT BEHAVIORAL DISTURBANCE (HCC): Primary | ICD-10-CM

## 2019-04-30 NOTE — PROGRESS NOTES
Interactive psychotherapy session with patient and son and friend and son's fiance today. I reviewed the results of the recent Neuropsychological Evaluation, including discussing individual tests as well as patient's areas of neurocognitive strength versus weakness. Prior to seeing the patient I reviewed the records, including the previously completed report, the records in [place], and any updated visits from other providers since I saw the patient last.      We discussed, in detail, the following: This is an abnormal range Neuropsychological Evaluation with respect to neurocognitive functioning. In this regard, she is showing impairments with mental status, confrontation naming, visual attention, auditory learning, auditory memory, processing speed, working memory, bilateral  strength (right hand worse than left) and executive functioning. Casual language skills are an important strength but may serve to mask underlying cognitive deficits at times. Emotionally, there is concern for depression and anxiety. I also wonder about PTSD. Psychosis and OCD concerns are raised by the family as well. She has a history of strokes and carotid issues impacting both hemispheres.                   In my opinion, there is a combination of organic and functional/psychiatric issues here. Concern is for vascular dementia compounded by mood. This could be a mixed AD and vascular dementia type issue. Serial testing is quite important here, given the psychiatric overlay, as I cannot clearly delineate if she is worsening over time due to mood or due to a dementing condition. Thus, I suggest psychiatric intervention for depression and anxiety and to rule out psychosis. Active engagement in psychotherapy is strongly advised. Consider an appropriate medication for attention if not medically contraindicated. I am concerned about her capacity to make informed decisions and suggest a POA be assigned.   Consider consult with Speech and OT as well. I would like to see her again in six months or prn to track any changes and to see if this is truly a dementia or not. My clinical impression at this point is that this IS a dementia. Clinical correlation is strongly advised. Stay active mentally, physically, and socially. Medication for memory should be considered as well. Medications and finances need to be supervised. This is a very complex case with strong psychiatric overlay and significant neurologic history. Baseline now established. Follow up as soon as mood improves. Clinical correlation is, of course, indicated. Education was provided regarding my diagnostic impressions, and we discussed treatment plan/options. I also answered numerous questions related to the clinical findings, including discussing various methods to improve cognition and mood. Counseling provided regarding mood and cognition. CBT and supportive psychotherapy techniques were utilized. The patient will follow up with the referring provider, and reported being very pleased with the services provided. [facility] 50 minutes psychotherapy with patient and family discussing the above. To see psychiatry and psychology. She has always been on medication for mood. She has not been on any of late. She needs to be, because we discussed impact of mood upon cognition. Depression and anxiety ten times worse here. To see psychiatry.

## 2019-05-03 ENCOUNTER — OFFICE VISIT (OUTPATIENT)
Dept: FAMILY MEDICINE CLINIC | Age: 64
End: 2019-05-03

## 2019-05-03 VITALS
RESPIRATION RATE: 17 BRPM | HEART RATE: 67 BPM | OXYGEN SATURATION: 95 % | BODY MASS INDEX: 34.26 KG/M2 | TEMPERATURE: 98.4 F | HEIGHT: 69 IN | DIASTOLIC BLOOD PRESSURE: 84 MMHG | SYSTOLIC BLOOD PRESSURE: 149 MMHG

## 2019-05-03 DIAGNOSIS — F32.A ANXIETY AND DEPRESSION: Primary | ICD-10-CM

## 2019-05-03 DIAGNOSIS — I10 ESSENTIAL HYPERTENSION: ICD-10-CM

## 2019-05-03 DIAGNOSIS — F41.9 ANXIETY AND DEPRESSION: Primary | ICD-10-CM

## 2019-05-03 DIAGNOSIS — E11.9 CONTROLLED TYPE 2 DIABETES MELLITUS WITHOUT COMPLICATION, WITHOUT LONG-TERM CURRENT USE OF INSULIN (HCC): ICD-10-CM

## 2019-05-03 DIAGNOSIS — G62.9 NEUROPATHY: ICD-10-CM

## 2019-05-03 RX ORDER — SERTRALINE HYDROCHLORIDE 25 MG/1
25 TABLET, FILM COATED ORAL DAILY
Qty: 30 TAB | Refills: 0 | Status: SHIPPED | OUTPATIENT
Start: 2019-05-03 | End: 2019-06-03 | Stop reason: SDUPTHER

## 2019-05-03 RX ORDER — GABAPENTIN 100 MG/1
100 CAPSULE ORAL
Qty: 30 CAP | Refills: 0 | Status: SHIPPED | OUTPATIENT
Start: 2019-05-03 | End: 2019-06-03 | Stop reason: SDUPTHER

## 2019-05-03 NOTE — PATIENT INSTRUCTIONS

## 2019-05-03 NOTE — PROGRESS NOTES
Chief Complaint   Patient presents with    Other     discuss anxiety and depression with doctor     Blood pressure 149/84, pulse 67, temperature 98.4 °F (36.9 °C), temperature source Oral, resp. rate 17, height 5' 9\" (1.753 m), SpO2 95 %. 1. Have you been to the ER, urgent care clinic since your last visit? Hospitalized since your last visit? No    2. Have you seen or consulted any other health care providers outside of the [address] [place] [address] since your last visit? Include any pap smears or colon screening.  No

## 2019-05-03 NOTE — PROGRESS NOTES
[address] [facility] [address] [place] [address]   Office [phone], Fax [phone]      Subjective:     Chief Complaint   Patient presents with    Other     discuss anxiety and depression with doctor     History provided by patient     HPI:  Humberto Marroquin is a 66 y.o. WHITE [place]  female with significant history of HTN, CAD, Type 2 DM, HLD, Dementia, bilateral carotid artery disease presenting for anxiety and depression. accompanied by son, son's girlfriend and family friend. Patient was seen by neuropsych multiple times and had neuropscyhological evaluation, which showed imparement with mental status. It was suggested that there is organic and functional psychatric issues and there is is concern for vascular dementia compounded by mood ( Alzhiemer's dementia and vascular dementia). She was recommenced to have psychiatric intervention for depression and anxiety and to rule out psychosis and presented today. There was also a concern for her capacity to make informed decision and currently discussing about with her family about assigning POA. No decisions have been made. It was also recommended to consider speech and OT therapy at home. Today, she would like to discuss about the following issues:     Depression: Patient reports she is  depressed. States that she does not want to be here. Patient tearful and bursts out in  tears sporadically. Currently lives with her son and his girlfriend. Per son,  patient with history of depression in the past approx over 20 years ago. He states that she was diagnosed with ?severe manic depression when he was a child. When patient was asked, she states that she used to take ativan for long period of time for anxiety and depression. She states that she oversleeps. Her appetite is good. Her typical day consists of getting up in the morning, eating breakfast and watching TV all day. No activity out isde of the house.  Patient previously declined in joining adult day care. Denies SI/HI. Denies panic attacks. 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 PHq 9 score of 26  XUAN score of 27       Foot pain: L>R for years. Mostly in heels. Pain mostly in the morning. Patient had plantar facicitis before and it feels the same. Quit smoking 3 years. Does not drink alcohol. Patient got approved for disability recently. Review of Systems   Constitutional: Negative for chills and fever. Respiratory: Negative for shortness of breath. Cardiovascular: Negative for chest pain. Gastrointestinal: Negative for nausea and vomiting. Musculoskeletal: Negative for falls. Neurological: Negative for dizziness and seizures. Right sided weakness at basline    Past Medical History:   Diagnosis Date    Depression     Diverticulitis 3/2013    [facility]) [facility])     Hirsutism     HTN (hypertension)     Hypercholesterolemia     Stroke ([facility] [address]     Type II or unspecified type diabetes mellitus without mention of complication, not stated as uncontrolled     Virilization ([facility])        Current Outpatient Medications on File Prior to Visit   Medication Sig Dispense Refill    atorvastatin (LIPITOR) 40 mg tablet TAKE ONE TABLET BY MOUTH ONE TIME DAILY 30 Tab 6    lisinopril (PRINIVIL, ZESTRIL) 40 mg tablet TAKE ONE TABLET BY MOUTH ONE TIME DAILY 90 Tab 1    clopidogrel (PLAVIX) 75 mg tab TAKE ONE TABLET BY MOUTH ONE TIME DAILY  6    glipiZIDE (GLUCOTROL) 10 mg tablet TAKE ONE TABLET BY MOUTH ONE TIME DAILY  6    metoprolol succinate (TOPROL-XL) 50 mg XL tablet TAKE ONE TABLET BY MOUTH ONE TIME DAILY  5    aspirin (ASPIRIN) 325 mg tablet Take 1 Tab by mouth daily. Indications: Cerebral Thromboembolism Prevention 1 Tab 0    acetaminophen/diphenhydramine (TYLENOL PM PO) Take  by mouth.  PREGABALIN (LYRICA PO) Take  by mouth. No current facility-administered medications on file prior to visit.         Allergies   Allergen Reactions    Pcn [Penicillins] Rash    Tramadol Nausea and Vomiting       Past Surgical History:   Procedure Laterality Date    HX APPENDECTOMY  1969    HX OTHER SURGICAL      ovaries removed        Social History     Socioeconomic History    Marital status:      Spouse name: Not on file    Number of children: Not on file    Years of education: Not on file    Highest education level: Not on file   Occupational History    Not on file   Social Needs    Financial resource strain: Not on file    Food insecurity:     Worry: Not on file     Inability: Not on file    Transportation needs:     Medical: Not on file     Non-medical: Not on file   Tobacco Use    Smoking status: Former Smoker     Last attempt to quit: 2018     Years since quittin.0    Smokeless tobacco: Never Used    Tobacco comment: 2018   Substance and Sexual Activity    Alcohol use: No    Drug use: Yes     Types: Marijuana    Sexual activity: Not on file   Lifestyle    Physical activity:     Days per week: Not on file     Minutes per session: Not on file    Stress: Not on file   Relationships    Social connections:     Talks on phone: Not on file     Gets together: Not on file     Attends Shinto service: Not on file     Active member of club or organization: Not on file     Attends meetings of clubs or organizations: Not on file     Relationship status: Not on file    Intimate partner violence:     Fear of current or ex partner: Not on file     Emotionally abused: Not on file     Physically abused: Not on file     Forced sexual activity: Not on file   Other Topics Concern    Not on file   Social History Narrative    Not on file       Patient Active Problem List   Diagnosis Code    Hirsutism L68.0    Virilization ([facility] [address] E25.9    Bilateral carotid artery disease ([facility] [address] I77.9    Controlled type 2 diabetes mellitus, without long-term current use of insulin ([facility] [address] E11.9    Essential hypertension I10    Other hyperlipidemia E78.49    Class 1 obesity in adult E66.9    History of CVA (cerebrovascular accident) Z86.73         Objective:   Vitals - reviewed  Visit Vitals  /84   Pulse 67   Temp 98.4 °F (36.9 °C) (Oral)   Resp 17   Ht 5' 9\" (1.753 m)   SpO2 95%   BMI 34.26 kg/m²        Physical Exam   Constitutional: She appears well-developed and well-nourished. Patient with bursts of crying    HENT:   Head: Normocephalic and atraumatic. Cardiovascular: Normal rate, regular rhythm and normal heart sounds. Pulmonary/Chest: Effort normal. No respiratory distress. She has no rales. Abdominal: Soft. She exhibits no distension. Neurological:   Right sided weakness at baseline   Patient on wheelchair, gait not tested   Strength 5/5 in lower and upper extremities b/l   Monofilament foot exam: decreased sensation in the left 5th metatarsal and heel region. Decreased sensation in the the right 5th metatarsal region               Assessment and orders:       ICD-10-CM ICD-9-CM    1. Anxiety and depression F41.9 300.00     F32.9 311    2. Neuropathy G62.9 355.9    3. Controlled type 2 diabetes mellitus without complication, without long-term current use of insulin ([facility]) V68.6 575.99 METABOLIC PANEL, COMPREHENSIVE      HEMOGLOBIN A1C WITH EAG      AMB POC URINE, MICROALBUMIN, SEMIQUANT (3 RESULTS)      MICROALBUMIN, UR, RAND W/ MICROALB/CREAT RATIO   4. Essential hypertension I10 401.9 LIPID PANEL      AMB POC URINE, MICROALBUMIN, SEMIQUANT (3 RESULTS)      MICROALBUMIN, UR, RAND W/ MICROALB/CREAT RATIO      CANCELED: AMB POC URINE, MICROALBUMIN, SEMIQUANT (3 RESULTS)     Diagnoses and all orders for this visit:    1. Anxiety and depression: Multifactorial. Patient bursting out in tears during history and physical exam. Per son, patient has history of depression and was on medications 20 years ago. Does not remember the medication that she was on. Patient sedentary. Mostly wheelchair bound, does not want to get up and walk around.  Has right sided weakness at baseline 2/2 CVA. She watches TV all day. Sometimes she feels like she is \" better off not being here\". Denies SI/HI. Given no records of past depression. PHq 9 score of 26 and  XUAN score of 27  Will start with low dose of Zoloft and titrate up as needed. Explained to patient that it takes about 4-6 weeks to take a full effect. Also recommended outpatient psych therapy. - Patient to have close follow up in 2 weeks    2. Neuropathy: feet pain for years, was on lyrica?gabapentin long time ago, discontinued due to cost?, denies allergies. Monofilament test: Left foot with decreased sensation in the left metatarsal and heel. Right foot with loss of sensation in the 5th metatarsal region. Also mixed picture of plantar fascitis. Info on night plantar fasciitis sock provided  - Will start with low dose of gabapentin 100 mg QHS and titrate as needed. 3. Controlled type 2 diabetes mellitus without complication, without long-term current use of insulin ([facility] [address]): Last A1C was 6.2 9 months ago. Currently on glipizide. Recommend to get labs today and discuss lab results at next visit:   -     METABOLIC PANEL, COMPREHENSIVE  -     HEMOGLOBIN A1C WITH EAG  -     AMB POC URINE, MICROALBUMIN, SEMIQUANT (3 RESULTS); Future  -     MICROALBUMIN, UR, RAND W/ MICROALB/CREAT RATIO    4. Essential hypertension: BP today 149/84, not at goal. On lisinopril 40 mg PO daily  And metoprolol 50 mg PO daily. Will obtain labs today and discuss about adding additional medication at next visit. -     LIPID PANEL  -     AMB POC URINE, MICROALBUMIN, SEMIQUANT (3 RESULTS); Future  -     MICROALBUMIN, UR, RAND W/ MICROALB/CREAT RATIO    Other orders  -     sertraline (ZOLOFT) 25 mg tablet; Take 1 Tab by mouth daily.  -     gabapentin (NEURONTIN) 100 mg capsule; Take 1 Cap by mouth nightly.       Social:  Per neuropscy recommendation, patient will need PT/OT and speech set up at home if her mood and motivation is not better after treating depression. Pt was discussed with Dr. Gely Chavez (attending physician). I have reviewed patient medical and social history and medications. I have reviewed pertinent labs results and other data. I have discussed the diagnosis with the patient and the intended plan as seen in the above orders. The patient has received an after-visit summary and questions were answered concerning future plans. I have discussed medication side effects and warnings with the patient as well.     Josh Baxter MD  Resident [facility]  05/03/19

## 2019-05-04 LAB
ALBUMIN SERPL-MCNC: 4.1 G/DL (ref 3.6–4.8)
ALBUMIN/GLOB SERPL: 1.3 {RATIO} (ref 1.2–2.2)
ALP SERPL-CCNC: 108 IU/L (ref 39–117)
ALT SERPL-CCNC: 17 IU/L (ref 0–32)
AST SERPL-CCNC: 15 IU/L (ref 0–40)
BILIRUB SERPL-MCNC: 0.3 MG/DL (ref 0–1.2)
BUN SERPL-MCNC: 19 MG/DL (ref 8–27)
BUN/CREAT SERPL: 19 (ref 12–28)
CALCIUM SERPL-MCNC: 9.5 MG/DL (ref 8.7–10.3)
CHLORIDE SERPL-SCNC: 98 MMOL/L (ref 96–106)
CHOLEST SERPL-MCNC: 126 MG/DL (ref 100–199)
CO2 SERPL-SCNC: 21 MMOL/L (ref 20–29)
CREAT SERPL-MCNC: 0.98 MG/DL (ref 0.57–1)
EST. AVERAGE GLUCOSE BLD GHB EST-MCNC: 143 MG/DL
GLOBULIN SER CALC-MCNC: 3.2 G/DL (ref 1.5–4.5)
GLUCOSE SERPL-MCNC: 187 MG/DL (ref 65–99)
HBA1C MFR BLD: 6.6 % (ref 4.8–5.6)
HDLC SERPL-MCNC: 44 MG/DL
INTERPRETATION, 910389: NORMAL
LDLC SERPL CALC-MCNC: 66 MG/DL (ref 0–99)
Lab: NORMAL
POTASSIUM SERPL-SCNC: 4.9 MMOL/L (ref 3.5–5.2)
PROT SERPL-MCNC: 7.3 G/DL (ref 6–8.5)
SODIUM SERPL-SCNC: 137 MMOL/L (ref 134–144)
TRIGL SERPL-MCNC: 79 MG/DL (ref 0–149)
VLDLC SERPL CALC-MCNC: 16 MG/DL (ref 5–40)

## 2019-05-06 ENCOUNTER — TELEPHONE (OUTPATIENT)
Dept: FAMILY MEDICINE CLINIC | Age: 64
End: 2019-05-06

## 2019-05-06 NOTE — TELEPHONE ENCOUNTER
Notified Dhruv Gupta on hipaa for patient to follow up in 2 weeks per Dr. Ritika Austin. Dhruv Gupta verbalized understanding.

## 2019-05-12 NOTE — PROGRESS NOTES
CMP: elevated glucose, otherwise unremarkable   A1C: 6.6  Lipid panel: LDL 66, unremarkable  Will discuss results at next visit

## 2019-05-21 ENCOUNTER — OFFICE VISIT (OUTPATIENT)
Dept: FAMILY MEDICINE CLINIC | Age: 64
End: 2019-05-21

## 2019-05-21 VITALS
OXYGEN SATURATION: 95 % | TEMPERATURE: 98.3 F | HEART RATE: 57 BPM | DIASTOLIC BLOOD PRESSURE: 72 MMHG | SYSTOLIC BLOOD PRESSURE: 143 MMHG | RESPIRATION RATE: 17 BRPM

## 2019-05-21 DIAGNOSIS — I63.9 CEREBROVASCULAR ACCIDENT (CVA), UNSPECIFIED MECHANISM (HCC): ICD-10-CM

## 2019-05-21 DIAGNOSIS — R53.1 RIGHT SIDED WEAKNESS: ICD-10-CM

## 2019-05-21 DIAGNOSIS — F32.A ANXIETY AND DEPRESSION: Primary | ICD-10-CM

## 2019-05-21 DIAGNOSIS — F41.9 ANXIETY AND DEPRESSION: Primary | ICD-10-CM

## 2019-05-21 DIAGNOSIS — R53.1 GENERALIZED WEAKNESS: ICD-10-CM

## 2019-05-21 NOTE — PROGRESS NOTES
[address] [facility] [address] [place] [address]   Office [phone], Fax [phone]      Subjective:     Chief Complaint   Patient presents with    Anxiety     follow up    Depression     follow up        History provided by patient     HPI:  Bianka Flores is a 66 y.o. WHITE [place]  female with significant history of HTN, CAD, Type 2 DM, HLD and Dementia bilateral cartoid artery disease presenting for anxiety and Depression  follow up. Anxiety and Depression: Patient was started on Zoloft 25 mg three weeks ago and presenting today for follow up. .Denies side effect from medication. Patient states that her mood have been the same without any changes. Son's girlfriend noticed some changes as patient is more engaging now and attempts to walk from her bedroom to the living room to watch TV. However, she is still requiring bedside commode as she does not have the desire to get up and go to the bathroom . Patient states that she enjoys watching TV. Denies SI/Hi       Labs from previous visit reviewed: A1C 6.6, lipid panel: LDL 66. Currently on Lipitor 40 mg         Review of Systems   Constitutional: Negative for chills and fever. Eyes: Negative for blurred vision. Respiratory: Negative for shortness of breath. Cardiovascular: Negative for chest pain. Gastrointestinal: Negative for abdominal pain, nausea and vomiting. Genitourinary: Negative for dysuria. Musculoskeletal: Negative for falls. Neurological: Positive for weakness. Negative for dizziness and headaches. Psychiatric/Behavioral: Positive for depression. The patient is nervous/anxious.         Past Medical History:   Diagnosis Date    Depression     Diverticulitis 3/2013    DVT (deep venous thrombosis) (HCC)     Hirsutism     HTN (hypertension)     Hypercholesterolemia     Stroke ([facility])     Type II or unspecified type diabetes mellitus without mention of complication, not stated as uncontrolled     Virilization ([facility] [address] Current Outpatient Medications on File Prior to Visit   Medication Sig Dispense Refill    sertraline (ZOLOFT) 25 mg tablet Take 1 Tab by mouth daily. 30 Tab 0    gabapentin (NEURONTIN) 100 mg capsule Take 1 Cap by mouth nightly. 30 Cap 0    atorvastatin (LIPITOR) 40 mg tablet TAKE ONE TABLET BY MOUTH ONE TIME DAILY 30 Tab 6    lisinopril (PRINIVIL, ZESTRIL) 40 mg tablet TAKE ONE TABLET BY MOUTH ONE TIME DAILY 90 Tab 1    acetaminophen/diphenhydramine (TYLENOL PM PO) Take  by mouth.  clopidogrel (PLAVIX) 75 mg tab TAKE ONE TABLET BY MOUTH ONE TIME DAILY  6    glipiZIDE (GLUCOTROL) 10 mg tablet TAKE ONE TABLET BY MOUTH ONE TIME DAILY  6    metoprolol succinate (TOPROL-XL) 50 mg XL tablet TAKE ONE TABLET BY MOUTH ONE TIME DAILY  5    aspirin (ASPIRIN) 325 mg tablet Take 1 Tab by mouth daily. Indications: Cerebral Thromboembolism Prevention 1 Tab 0     No current facility-administered medications on file prior to visit.         Allergies   Allergen Reactions    Pcn [Penicillins] Rash    Tramadol Nausea and Vomiting       Past Surgical History:   Procedure Laterality Date    HX APPENDECTOMY      HX OTHER SURGICAL      ovaries removed        Social History     Socioeconomic History    Marital status:      Spouse name: Not on file    Number of children: Not on file    Years of education: Not on file    Highest education level: Not on file   Occupational History    Not on file   Social Needs    Financial resource strain: Not on file    Food insecurity:     Worry: Not on file     Inability: Not on file    Transportation needs:     Medical: Not on file     Non-medical: Not on file   Tobacco Use    Smoking status: Former Smoker     Last attempt to quit: 2018     Years since quittin.0    Smokeless tobacco: Never Used    Tobacco comment: 2018   Substance and Sexual Activity    Alcohol use: No    Drug use: Yes     Types: Marijuana    Sexual activity: Not on file Lifestyle    Physical activity:     Days per week: Not on file     Minutes per session: Not on file    Stress: Not on file   Relationships    Social connections:     Talks on phone: Not on file     Gets together: Not on file     Attends Hoahaoism service: Not on file     Active member of club or organization: Not on file     Attends meetings of clubs or organizations: Not on file     Relationship status: Not on file    Intimate partner violence:     Fear of current or ex partner: Not on file     Emotionally abused: Not on file     Physically abused: Not on file     Forced sexual activity: Not on file   Other Topics Concern    Not on file   Social History Narrative    Not on file       Patient Active Problem List   Diagnosis Code    Hirsutism L68.0    Virilization ([facility] [address] E25.9    Bilateral carotid artery disease ([facility] [address] I77.9    Controlled type 2 diabetes mellitus, without long-term current use of insulin ([facility] [address] E11.9    Essential hypertension I10    Other hyperlipidemia E78.49    Class 1 obesity in adult E66.9    History of CVA (cerebrovascular accident) Z86.73         Objective:   Vitals - reviewed  Visit Vitals  /72   Pulse (!) 57   Temp 98.3 °F (36.8 °C) (Oral)   Resp 17   SpO2 95%        Physical Exam   Constitutional: She is oriented to person, place, and time. She appears well-developed and well-nourished. HENT:   Head: Normocephalic and atraumatic. Cardiovascular: Normal rate, regular rhythm and normal heart sounds. Pulmonary/Chest: Effort normal and breath sounds normal.   Abdominal: Soft. She exhibits no distension. Musculoskeletal: Normal range of motion. She exhibits no edema or deformity. Neurological: She is alert and oriented to person, place, and time. Strength: 4/5 in right upper extremity, 5/5 in right lower extremity  5/5 in left upper and lower extremity bilaterally     Psychiatric: Her speech is normal. She exhibits a depressed mood.          Right sided 4/5 Assessment and orders:       ICD-10-CM ICD-9-CM    1. Anxiety and depression F41.9 300.00     F32.9 311    2. Generalized weakness R53.1 780.79 REFERRAL TO HOME HEALTH   3. Cerebrovascular accident (CVA), unspecified mechanism ([facility] [address] I63.9 434.91    4. Right sided weakness R53.1 728.87      Diagnoses and all orders for this visit:    1. Anxiety and depression:  Low dose zoloft started 2.5 weeks ago. Tolerating medication without significant change in the mood. However, son's girlfriend noticed some improvement in her mood. PHq 9 score improved from 26 to 21 todday and XUAN 7 score improved from 27 to 15. Explained to patient that it takes about 4-6 weeks to take full effect of Zoloft. Encouraged patient to be more active. - Consider checking TSH, vitamin B12 and folate to evaluate if there is component of underlying dementia       2. Generalized weakness: with baseline right sided weakness 2/2 CVA  -     REFERRAL TO HOME HEALTH          Pt was discussed with Dr. Aleks Veloz (attending physician). I have reviewed patient medical and social history and medications. I have reviewed pertinent labs results and other data. I have discussed the diagnosis with the patient and the intended plan as seen in the above orders. The patient has received an after-visit summary and questions were answered concerning future plans. I have discussed medication side effects and warnings with the patient as well.     David Stahl MD  Resident [facility]  05/21/19

## 2019-05-21 NOTE — PROGRESS NOTES
Chief Complaint   Patient presents with    Anxiety     follow up    Depression     follow up      Blood pressure 143/72, pulse (!) 57, temperature 98.3 °F (36.8 °C), temperature source Oral, resp. rate 17, SpO2 95 %. 1. Have you been to the ER, urgent care clinic since your last visit? Hospitalized since your last visit? No    2. Have you seen or consulted any other health care providers outside of the [address] [place] [address] since your last visit? Include any pap smears or colon screening.  No

## 2019-05-21 NOTE — PATIENT INSTRUCTIONS

## 2019-05-23 NOTE — PROGRESS NOTES
[address] Medicine Residency Attending Addendum:  Patient encounter was discussed on the day of the encounter with Myrtle Le MD, performing the key elements of the service. I discussed the findings, assessment and plan with the resident and agree with the resident's findings and plan as documented in the resident's note.       Pretty Taylor MD, CAQSM, RMSK

## 2019-06-04 RX ORDER — GABAPENTIN 100 MG/1
CAPSULE ORAL
Qty: 30 CAP | Refills: 0 | Status: SHIPPED | OUTPATIENT
Start: 2019-06-04 | End: 2019-01-01 | Stop reason: SDUPTHER

## 2019-06-04 RX ORDER — SERTRALINE HYDROCHLORIDE 25 MG/1
TABLET, FILM COATED ORAL
Qty: 30 TAB | Refills: 0 | Status: SHIPPED | OUTPATIENT
Start: 2019-06-04 | End: 2019-07-03 | Stop reason: DRUGHIGH

## 2019-07-02 NOTE — PROGRESS NOTES
HPI       Chief Complaint: Follow up     Noel Street is a 66 y.o. female who presents for follow up. Anxiety/Depression - zoloft 25mg daily started 5/3/2019, symptoms have not improved at all. Denies any side effects. In the past has also tried ativan. Pt declines therapist or counselor. Friend states she doesn't want to get out of the bed, is hard to motivate her to do anything. PHQ-9:   1. Loss of pleasure in doing things? 3  2. Feeling down or depressed? 3  3. Trouble sleeping? 3  4. No energy or fatigue? 3  5. Poor appetite or overeating? 0  6. Feeling you have let others down or you are a failure? 1  7. Trouble concentrating? 3  8. Moving slowly or fidgety all the time? 0  9. Thinking you would be better off dead or thoughts of hurting yourself? 2. Has been thinking about it but does not have a plan. Would never do it. \"Too chicken. I'm not that sick. \"    Total: 18 (previously 21 and 26)     XUAN-7:   1. Feeling nervous, anxious or on edge?  1  2. Not being able to stop or control worrying? 1  3. Worrying too much about different things? 1  4. Trouble relaxing? 0  5. Being so restless that it is hard to sit still? 0  6. Becoming easily annoyed or irritable? 3  7. Feeling afraid that something awful might happen? 1   Total: 7 (mild) - previously 15 and 27     Is wheelchair bound. Also requesting [facility] referral for debility. This was placed at last visit but they did not receive a phone call or follow up. HTN - lisinopril 40mg daily and metoprolol 50mg daily. Lisinopril dosing was recently increased. Took meds today. Takes one in AM and one in PM. Does not check home BPs - does not have cuff through friend thinks she can get her one.      PMHx:  Past Medical History:   Diagnosis Date    Depression     Diverticulitis 3/2013    DVT (deep venous thrombosis) (HCC)     Hirsutism     HTN (hypertension)     Hypercholesterolemia     Stroke (HCC)     Type II or unspecified type diabetes mellitus without mention of complication, not stated as uncontrolled     Virilization (HCC)      Meds:   Current Outpatient Medications   Medication Sig Dispense Refill    sertraline (ZOLOFT) 50 mg tablet Take 1 Tab by mouth daily. 30 Tab 1    gabapentin (NEURONTIN) 100 mg capsule TAKE ONE CAPSULE BY MOUTH ONE TIME DAILY AT NIGHT 30 Cap 0    atorvastatin (LIPITOR) 40 mg tablet TAKE ONE TABLET BY MOUTH ONE TIME DAILY 30 Tab 6    lisinopril (PRINIVIL, ZESTRIL) 40 mg tablet TAKE ONE TABLET BY MOUTH ONE TIME DAILY 90 Tab 1    acetaminophen/diphenhydramine (TYLENOL PM PO) Take  by mouth.  clopidogrel (PLAVIX) 75 mg tab TAKE ONE TABLET BY MOUTH ONE TIME DAILY  6    glipiZIDE (GLUCOTROL) 10 mg tablet TAKE ONE TABLET BY MOUTH ONE TIME DAILY  6    metoprolol succinate (TOPROL-XL) 50 mg XL tablet TAKE ONE TABLET BY MOUTH ONE TIME DAILY  5    aspirin (ASPIRIN) 325 mg tablet Take 1 Tab by mouth daily. Indications: Cerebral Thromboembolism Prevention 1 Tab 0     Allergies: Allergies   Allergen Reactions    Pcn [Penicillins] Rash    Tramadol Nausea and Vomiting     ROS  Review of Systems   Constitutional: Negative for chills and fever. Respiratory: Negative for cough, shortness of breath and wheezing. Cardiovascular: Negative for chest pain and palpitations. Gastrointestinal: Negative for abdominal pain, constipation, diarrhea, nausea and vomiting. Musculoskeletal: Positive for falls. Neurological: Negative for dizziness and headaches. Psychiatric/Behavioral: Positive for depression. The patient is nervous/anxious. Physical Exam:  Visit Vitals  BP (!) 161/99   Pulse 63   Temp 98.3 °F (36.8 °C) (Oral)   Resp 16   SpO2 99%      Physical Exam   Constitutional: She is oriented to person, place, and time. She appears well-developed and well-nourished. Obese, in wheelchair   HENT:   Head: Normocephalic and atraumatic. Eyes: EOM are normal.   Neck: Normal range of motion. Neck supple.  No thyromegaly present. Cardiovascular: Normal rate and regular rhythm. No murmur heard. Pulmonary/Chest: Effort normal and breath sounds normal. No respiratory distress. She has no wheezes. She has no rales. Abdominal: Soft. Bowel sounds are normal. She exhibits no distension. There is no tenderness. Neurological: She is alert and oriented to person, place, and time. Psychiatric:   Flat affect   Vitals reviewed. Assessment     66 y.o. female with:    ICD-10-CM ICD-9-CM    1. Anxiety and depression F41.9 300.00 sertraline (ZOLOFT) 50 mg tablet    F32.9 311 TSH REFLEX TO T4      VITAMIN B12      FOLATE      CBC W/O DIFF      BEHAV ASSMT W/SCORE & DOCD/STAND INSTRUMENT   2. Age-related physical debility R54 797 REFERRAL TO [facility]    3. Essential hypertension I10 401.9 CANCELED: MICROALBUMIN, UR, RAND W/ MICROALB/CREAT RATIO   4. Controlled type 2 diabetes mellitus without complication, without long-term current use of insulin ([facility]) E11.9 250.00 MICROALBUMIN, UR, RAND W/ MICROALB/CREAT RATIO              Plan     1. Age-related physical debility  Called [address] and confirmed they service the [facility]. Referral placed again, phone number provided to pt for her to /[address]    2. Anxiety and depression  Pt reports no improvement though PHQ9 and GAD7 both improved. PH9 was 18 (previously 21 and 26) and GAD7 was 7 (previously 15 and 27). Will increase zoloft. Will obtain labs per Dr. Ayan Kenny last note   - sertraline (ZOLOFT) 50 mg tablet; Take 1 Tab by mouth daily. Dispense: 30 Tab; Refill: 1  - TSH REFLEX TO T4  - VITAMIN B12  - FOLATE  - CBC W/O DIFF  - BEHAV ASSMT W/SCORE & DOCD/STAND INSTRUMENT    3. Essential hypertension  Not well controlled. Pt's friend thinks she can obtain home BP cuff. PT to check BP daily and bring log to next visit.      4. Controlled type 2 diabetes mellitus without complication, without long-term current use of insulin ([facility] [address]  Noted that labs ordered by Dr. Rosenda English 5/3 not yet drawn - reordered today   - MICROALBUMIN, UR, RAND W/ MICROALB/CREAT RATIO    Follow-up and Dispositions    · Return in about 3 weeks (around 7/24/2019) for depression and HTN follow up. Patient discussed with Dr. Tian Will (attending physician)    I have discussed the diagnosis with the patient and the intended plan as seen in the above orders. The patient has received an after-visit summary and questions were answered concerning future plans. I have discussed medication side effects and warnings with the patient as well.     Jamshid Davis MD  Family Medicine Resident  PGY-3

## 2019-07-03 ENCOUNTER — HOME CARE VISIT (OUTPATIENT)
Dept: HOME HEALTH SERVICES | Facility: HOME HEALTH | Age: 64
End: 2019-07-03

## 2019-07-03 ENCOUNTER — HOME HEALTH ADMISSION (OUTPATIENT)
Dept: HOME HEALTH SERVICES | Facility: HOME HEALTH | Age: 64
End: 2019-07-03
Payer: MEDICAID

## 2019-07-03 ENCOUNTER — OFFICE VISIT (OUTPATIENT)
Dept: FAMILY MEDICINE CLINIC | Age: 64
End: 2019-07-03

## 2019-07-03 VITALS
HEART RATE: 63 BPM | TEMPERATURE: 98.3 F | SYSTOLIC BLOOD PRESSURE: 161 MMHG | RESPIRATION RATE: 16 BRPM | OXYGEN SATURATION: 99 % | DIASTOLIC BLOOD PRESSURE: 99 MMHG

## 2019-07-03 DIAGNOSIS — E11.9 CONTROLLED TYPE 2 DIABETES MELLITUS WITHOUT COMPLICATION, WITHOUT LONG-TERM CURRENT USE OF INSULIN (HCC): ICD-10-CM

## 2019-07-03 DIAGNOSIS — E25.9 VIRILIZATION (HCC): ICD-10-CM

## 2019-07-03 DIAGNOSIS — I10 ESSENTIAL HYPERTENSION: ICD-10-CM

## 2019-07-03 DIAGNOSIS — F32.A ANXIETY AND DEPRESSION: Primary | ICD-10-CM

## 2019-07-03 DIAGNOSIS — R54 AGE-RELATED PHYSICAL DEBILITY: ICD-10-CM

## 2019-07-03 DIAGNOSIS — E11.8 CONTROLLED TYPE 2 DIABETES MELLITUS WITH COMPLICATION, WITHOUT LONG-TERM CURRENT USE OF INSULIN (HCC): ICD-10-CM

## 2019-07-03 DIAGNOSIS — F41.9 ANXIETY AND DEPRESSION: Primary | ICD-10-CM

## 2019-07-03 DIAGNOSIS — I77.9 BILATERAL CAROTID ARTERY DISEASE, UNSPECIFIED TYPE (HCC): ICD-10-CM

## 2019-07-03 RX ORDER — SERTRALINE HYDROCHLORIDE 50 MG/1
50 TABLET, FILM COATED ORAL DAILY
Qty: 30 TAB | Refills: 1 | Status: SHIPPED | OUTPATIENT
Start: 2019-07-03 | End: 2019-01-01 | Stop reason: SDUPTHER

## 2019-07-03 NOTE — PROGRESS NOTES
65 yo female here for followup    Started on zoloft which she states did not help her    Trying to arrange Home Health    161/99 -- same on re-check -- not checking her BP at home; will have Home Health check her BP    Plan: To increase sertraline to 50 mg a day    I reviewed with the resident the medical history and the resident's findings on the physical examination. I discussed with the resident the patient's diagnosis and concur with the plan.

## 2019-07-03 NOTE — PATIENT INSTRUCTIONS
Depression and Chronic Disease: Care Instructions  Your Care Instructions    A chronic disease is one that you have for a long time. Some chronic diseases can be controlled, but they usually cannot be cured. Depression is common in people with chronic diseases, but it often goes unnoticed. Many people have concerns about seeking treatment for a mental health problem. You may think it's a sign of weakness, or you don't want people to know about it. It's important to overcome these reasons for not seeking treatment. Treating depression or anxiety is good for your health. Follow-up care is a key part of your treatment and safety. Be sure to make and go to all appointments, and call your doctor if you are having problems. It's also a good idea to know your test results and keep a list of the medicines you take. How can you care for yourself at home? Watch for symptoms of depression  The symptoms of depression are often subtle at first. You may think they are caused by your disease rather than depression. Or you may think it is normal to be depressed when you have a chronic disease. If you are depressed you may:  · Feel sad or hopeless. · Feel guilty or worthless. · Not enjoy the things you used to enjoy. · Feel hopeless, as though life is not worth living. · Have trouble thinking or remembering. · Have low energy, and you may not eat or sleep well. · Pull away from others. · Think often about death or killing yourself. (Keep the numbers for these national suicide hotlines: 7-033-676-TALK [1-866.651.6692] and 5-861-MVQHJZN [1-419.314.3372]. )  Get treatment  By treating your depression, you can feel more hopeful and have more energy. If you feel better, you may take better care of yourself, so your health may improve. · Talk to your doctor if you have any changes in mood during treatment for your disease. · Ask your doctor for help.  Counseling, antidepressant medicine, or a combination of the two can help most people with depression. Often a combination works best. Counseling can also help you cope with having a chronic disease. When should you call for help? Call 911 anytime you think you may need emergency care. For example, call if:    · You feel like hurting yourself or someone else.     · Someone you know has depression and is about to attempt or is attempting suicide.   [facility] your doctor now or seek immediate medical care if:    · You hear voices.     · Someone you know has depression and:  ? Starts to give away his or her possessions. ? Uses illegal drugs or drinks alcohol heavily. ? Talks or writes about death, including writing suicide notes or talking about guns, knives, or pills. ? Starts to spend a lot of time alone. ? Acts very aggressively or suddenly appears calm.    Watch closely for changes in your health, and be sure to contact your doctor if:    · You do not get better as expected. Where can you learn more? Go to http://molly-fran.info/. Enter C757 in the search box to learn more about \"Depression and Chronic Disease: Care Instructions. \"  Current as of: September 11, 2018  Content Version: 11.9  © 9286-1684 [employer]. Care instructions adapted under license by [employer] (which disclaims liability or warranty for this information). If you have questions about a medical condition or this instruction, always ask your healthcare professional. [address] any warranty or liability for your use of this information.

## 2019-07-04 LAB
ALBUMIN/CREAT UR: 872.5 MG/G CREAT (ref 0–30)
CREAT UR-MCNC: 182.4 MG/DL
ERYTHROCYTE [DISTWIDTH] IN BLOOD BY AUTOMATED COUNT: 12.7 % (ref 12.3–15.4)
FOLATE SERPL-MCNC: 4.7 NG/ML
HCT VFR BLD AUTO: 41.3 % (ref 34–46.6)
HGB BLD-MCNC: 13.7 G/DL (ref 11.1–15.9)
MCH RBC QN AUTO: 29.5 PG (ref 26.6–33)
MCHC RBC AUTO-ENTMCNC: 33.2 G/DL (ref 31.5–35.7)
MCV RBC AUTO: 89 FL (ref 79–97)
MICROALBUMIN UR-MCNC: 1591.5 UG/ML
PLATELET # BLD AUTO: 322 X10E3/UL (ref 150–450)
RBC # BLD AUTO: 4.65 X10E6/UL (ref 3.77–5.28)
TSH SERPL DL<=0.005 MIU/L-ACNC: 1.22 UIU/ML (ref 0.45–4.5)
VIT B12 SERPL-MCNC: 467 PG/ML (ref 232–1245)
WBC # BLD AUTO: 9.2 X10E3/UL (ref 3.4–10.8)

## 2019-07-05 ENCOUNTER — HOME CARE VISIT (OUTPATIENT)
Dept: SCHEDULING | Facility: HOME HEALTH | Age: 64
End: 2019-07-05
Payer: MEDICAID

## 2019-07-05 VITALS
TEMPERATURE: 97.7 F | DIASTOLIC BLOOD PRESSURE: 88 MMHG | SYSTOLIC BLOOD PRESSURE: 140 MMHG | HEART RATE: 62 BPM | RESPIRATION RATE: 18 BRPM | OXYGEN SATURATION: 97 %

## 2019-07-05 PROCEDURE — 400013 HH SOC

## 2019-07-05 PROCEDURE — G0151 HHCP-SERV OF PT,EA 15 MIN: HCPCS

## 2019-07-05 NOTE — Clinical Note
Pt. seen for HHPT admission and evaluation, recommend PT POC 1w1, 2w4 for transfer training, gait training, balance training, instruction in HEP and instruction in home safety/fall prevention to maximize her safe function.     Thank you,    Vu Zaragoza PT, DPT, [facility]  [phone]

## 2019-07-09 ENCOUNTER — HOME CARE VISIT (OUTPATIENT)
Dept: SCHEDULING | Facility: HOME HEALTH | Age: 64
End: 2019-07-09
Payer: MEDICAID

## 2019-07-09 PROCEDURE — G0152 HHCP-SERV OF OT,EA 15 MIN: HCPCS

## 2019-07-09 PROCEDURE — G0157 HHC PT ASSISTANT EA 15: HCPCS

## 2019-07-10 VITALS
TEMPERATURE: 97.9 F | HEART RATE: 60 BPM | DIASTOLIC BLOOD PRESSURE: 90 MMHG | SYSTOLIC BLOOD PRESSURE: 160 MMHG | RESPIRATION RATE: 18 BRPM

## 2019-07-10 VITALS
OXYGEN SATURATION: 97 % | DIASTOLIC BLOOD PRESSURE: 80 MMHG | TEMPERATURE: 97.7 F | HEART RATE: 59 BPM | SYSTOLIC BLOOD PRESSURE: 150 MMHG

## 2019-07-12 ENCOUNTER — HOME CARE VISIT (OUTPATIENT)
Dept: SCHEDULING | Facility: HOME HEALTH | Age: 64
End: 2019-07-12
Payer: MEDICAID

## 2019-07-12 ENCOUNTER — HOME CARE VISIT (OUTPATIENT)
Dept: HOME HEALTH SERVICES | Facility: HOME HEALTH | Age: 64
End: 2019-07-12
Payer: MEDICAID

## 2019-07-17 ENCOUNTER — HOME CARE VISIT (OUTPATIENT)
Dept: SCHEDULING | Facility: HOME HEALTH | Age: 64
End: 2019-07-17
Payer: MEDICAID

## 2019-07-17 VITALS
DIASTOLIC BLOOD PRESSURE: 80 MMHG | SYSTOLIC BLOOD PRESSURE: 158 MMHG | HEART RATE: 58 BPM | TEMPERATURE: 98 F | OXYGEN SATURATION: 96 %

## 2019-07-17 VITALS
DIASTOLIC BLOOD PRESSURE: 80 MMHG | SYSTOLIC BLOOD PRESSURE: 158 MMHG | RESPIRATION RATE: 18 BRPM | HEART RATE: 58 BPM | TEMPERATURE: 98 F | OXYGEN SATURATION: 96 %

## 2019-07-17 PROCEDURE — G0152 HHCP-SERV OF OT,EA 15 MIN: HCPCS

## 2019-07-17 PROCEDURE — G0157 HHC PT ASSISTANT EA 15: HCPCS

## 2019-07-19 ENCOUNTER — HOME CARE VISIT (OUTPATIENT)
Dept: SCHEDULING | Facility: HOME HEALTH | Age: 64
End: 2019-07-19
Payer: MEDICAID

## 2019-07-19 VITALS
TEMPERATURE: 97 F | DIASTOLIC BLOOD PRESSURE: 70 MMHG | OXYGEN SATURATION: 95 % | HEART RATE: 52 BPM | SYSTOLIC BLOOD PRESSURE: 124 MMHG

## 2019-07-19 PROCEDURE — G0152 HHCP-SERV OF OT,EA 15 MIN: HCPCS

## 2019-07-19 PROCEDURE — G0157 HHC PT ASSISTANT EA 15: HCPCS

## 2019-07-20 VITALS
DIASTOLIC BLOOD PRESSURE: 88 MMHG | HEART RATE: 64 BPM | RESPIRATION RATE: 16 BRPM | OXYGEN SATURATION: 97 % | SYSTOLIC BLOOD PRESSURE: 152 MMHG | TEMPERATURE: 97.8 F

## 2019-08-01 NOTE — TELEPHONE ENCOUNTER
Radha Adams,   [phone]    [facility] called for verbal order to continue with home health physical therapy for 2 more weeks. Then once a week for 2 weeks.

## 2019-08-02 NOTE — TELEPHONE ENCOUNTER
Abraham Giron with [facility] from  [phone]    Request verbal order for PT, to access cognitive abilities    Also request extension of OT

## 2019-08-02 NOTE — TELEPHONE ENCOUNTER
Spoke with Sharon with [address] [place] [address], verbal orders given per Dr. Criselda Villegas and Dr. Cherise Nixon.
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
## 2019-08-06 NOTE — PATIENT INSTRUCTIONS
Depression and Chronic Disease: Care Instructions  Your Care Instructions    A chronic disease is one that you have for a long time. Some chronic diseases can be controlled, but they usually cannot be cured. Depression is common in people with chronic diseases, but it often goes unnoticed. Many people have concerns about seeking treatment for a mental health problem. You may think it's a sign of weakness, or you don't want people to know about it. It's important to overcome these reasons for not seeking treatment. Treating depression or anxiety is good for your health. Follow-up care is a key part of your treatment and safety. Be sure to make and go to all appointments, and call your doctor if you are having problems. It's also a good idea to know your test results and keep a list of the medicines you take. How can you care for yourself at home? Watch for symptoms of depression  The symptoms of depression are often subtle at first. You may think they are caused by your disease rather than depression. Or you may think it is normal to be depressed when you have a chronic disease. If you are depressed you may:  · Feel sad or hopeless. · Feel guilty or worthless. · Not enjoy the things you used to enjoy. · Feel hopeless, as though life is not worth living. · Have trouble thinking or remembering. · Have low energy, and you may not eat or sleep well. · Pull away from others. · Think often about death or killing yourself. (Keep the numbers for these national suicide hotlines: 5-300-905-TALK [1-759.372.9531] and 5-561-SJKBSXX [1-890.246.8846]. )  Get treatment  By treating your depression, you can feel more hopeful and have more energy. If you feel better, you may take better care of yourself, so your health may improve. · Talk to your doctor if you have any changes in mood during treatment for your disease. · Ask your doctor for help.  Counseling, antidepressant medicine, or a combination of the two can help most people with depression. Often a combination works best. Counseling can also help you cope with having a chronic disease. When should you call for help? Call 911 anytime you think you may need emergency care. For example, call if:    · You feel like hurting yourself or someone else.     · Someone you know has depression and is about to attempt or is attempting suicide.   [facility] your doctor now or seek immediate medical care if:    · You hear voices.     · Someone you know has depression and:  ? Starts to give away his or her possessions. ? Uses illegal drugs or drinks alcohol heavily. ? Talks or writes about death, including writing suicide notes or talking about guns, knives, or pills. ? Starts to spend a lot of time alone. ? Acts very aggressively or suddenly appears calm.    Watch closely for changes in your health, and be sure to contact your doctor if:    · You do not get better as expected. Where can you learn more? Go to http://molly-fran.info/. Enter G826 in the search box to learn more about \"Depression and Chronic Disease: Care Instructions. \"  Current as of: September 11, 2018  Content Version: 12.1  © 2508-5213 [employer]. Care instructions adapted under license by [employer] (which disclaims liability or warranty for this information). If you have questions about a medical condition or this instruction, always ask your healthcare professional. [address] any warranty or liability for your use of this information.
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
## 2019-08-07 NOTE — PROGRESS NOTES
[address] Medicine Residency Attending Addendum:  I saw and evaluated the patient on the day of the encounter with Shankar Baltazar MD  , performing the key elements of the service. I discussed the findings, assessment and plan with the resident and agree with the resident's findings and plan as documented in the resident's note.       Shelby Shafer MD, CAQSM, RMSK

## 2019-09-09 NOTE — PATIENT INSTRUCTIONS
[facility] Board:  [address], [address] [place] [address] Ln  24 hour crisis line: [phone]  Daytime number: [phone]  Psychiatry, counseling, case management, drug/alcohol addiction  [address] (Dr. Black Sierra): FaceUpdate.Noster Mobile.DiscountIF. com/  [address] [facility]. [address], [place], [address] [place] [address] Street  Phone: [phone])  Fax: [phone] e-mail address is being protected from spambots. You need Jobbr enabled to view it   Office Hours:  Mon: 10:00 am to 4:00 pm  Tue: 8:00 am to 6:00 pm  Wed-Thur: 8:00 am to 7:00 pm  [facility]: [email or web address] International. com/  [place] ([phone]),  [place] ([phone])  [place] ([phone])  [address] ([phone])  [address] [facility]  Addiction/Substance abuse   [place]: [phone]  [place]: Padmini [facility]: PrimÃ¢â‚¬â„¢Vision.Noster Mobile.  [address] , [address] [place] [address], [address] [place] [address] 23  [address]  Ph. [phone] Fax [phone]  [facility] Portillo Bernstein: [email or web address]  [address] ([phone])  [address] ([phone])  [facility] Psych[facility] - [address] [facility] [address]. [address] 27[address] [facility], [facility]5 [address], [address]  Phone: [phone]  Fax: [phone]  [facility] 24  [facility], [facility]  1400 [facility], [address]  Phone: [phone]  Fax: [phone]  Myra Downs M.D. [place], [address] [facility], [address]  ZAIDA Lima  [phone]  00 [facility] and [facility]  [address] [place] [address], [address] [place] [address], [address]  [phone]  Psychology counselling with Dr. Qian Leija # [phone] or Dr. Magali Tamez # [phone]  Or Dr. Karen Hernandez # [phone] suicide prevention hotline [phone]  Contract no to harm self or others was spoken by patient.

## 2019-09-09 NOTE — PROGRESS NOTES
[address] [facility] Carlota Mccl[address]   Office [phone], Fax [phone]      Subjective:     Chief Complaint   Patient presents with    Follow-up     anxiety and depression zoloft increased to 100mg , family states not working    Medication Refill     Gabapentin        HPI:  Rachel Hernandez is a 60 y.o. [place]ITE [place]  female  presenting for follow up of anxiety and depression. Patient states that she is more depressed than usual as her boyfriend left he about a month ago. Her boyfriend got into a fight with her son who is a care taker. Son states that she has no motivation any more. She could not work with home health  PT/OT due to her depressed mood. She was working well with them initially and was even out of her wheelchair but not she became wheelchair bound again. Her appetitie is good and states that all she wants to do is sleep. Both patient and son denied verbal or physical abuse at this time. Review of Systems   Constitutional: Negative for chills and fever. Eyes: Negative for blurred vision. Respiratory: Negative for shortness of breath. Cardiovascular: Negative for chest pain. Neurological: Negative for dizziness, seizures and headaches. Psychiatric/Behavioral: Positive for depression. Negative for hallucinations and suicidal ideas. The patient does not have insomnia.         Past Medical History:   Diagnosis Date    Depression     Diverticulitis 3/2013    DVT (deep venous thrombosis) (HCC)     Hirsutism     HTN (hypertension)     Hypercholesterolemia     Stroke ([facility]     Type II or unspecified type diabetes mellitus without mention of complication, not stated as uncontrolled     Virilization ([facility]        Current Outpatient Medications on File Prior to Visit   Medication Sig Dispense Refill    lisinopril (PRINIVIL, ZESTRIL) 40 mg tablet TAKE ONE TABLET BY MOUTH ONE TIME DAILY 90 Tab 1    sertraline (ZOLOFT) 100 mg tablet Take 1 Tab by mouth daily. 30 Tab 1    gabapentin (NEURONTIN) 100 mg capsule TAKE ONE CAPSULE BY MOUTH ONE TIME DAILY AT NIGHT 30 Cap 0    atorvastatin (LIPITOR) 40 mg tablet TAKE ONE TABLET BY MOUTH ONE TIME DAILY 30 Tab 6    acetaminophen/diphenhydramine (TYLENOL PM PO) Take  by mouth.  clopidogrel (PLAVIX) 75 mg tab TAKE ONE TABLET BY MOUTH ONE TIME DAILY  6    glipiZIDE (GLUCOTROL) 10 mg tablet TAKE ONE TABLET BY MOUTH ONE TIME DAILY  6    metoprolol succinate (TOPROL-XL) 50 mg XL tablet TAKE ONE TABLET BY MOUTH ONE TIME DAILY  5    aspirin (ASPIRIN) 325 mg tablet Take 1 Tab by mouth daily. Indications: Cerebral Thromboembolism Prevention 1 Tab 0     No current facility-administered medications on file prior to visit.         Allergies   Allergen Reactions    Pcn [Penicillins] Rash    Tramadol Nausea and Vomiting       Past Surgical History:   Procedure Laterality Date    HX APPENDECTOMY  1969    HX OTHER SURGICAL      ovaries removed        Social History     Socioeconomic History    Marital status:      Spouse name: Not on file    Number of children: Not on file    Years of education: Not on file    Highest education level: Not on file   Occupational History    Not on file   Social Needs    Financial resource strain: Not on file    Food insecurity:     Worry: Not on file     Inability: Not on file    Transportation needs:     Medical: Not on file     Non-medical: Not on file   Tobacco Use    Smoking status: Former Smoker     Last attempt to quit: 2018     Years since quittin.3    Smokeless tobacco: Never Used    Tobacco comment: 2018   Substance and Sexual Activity    Alcohol use: No    Drug use: Yes     Types: Marijuana    Sexual activity: Not on file   Lifestyle    Physical activity:     Days per week: Not on file     Minutes per session: Not on file    Stress: Not on file   Relationships    Social connections:     Talks on phone: Not on file     Gets together: Not on file Attends Bahai service: Not on file     Active member of club or organization: Not on file     Attends meetings of clubs or organizations: Not on file     Relationship status: Not on file    Intimate partner violence:     Fear of current or ex partner: Not on file     Emotionally abused: Not on file     Physically abused: Not on file     Forced sexual activity: Not on file   Other Topics Concern    Not on file   Social History Narrative    Not on file       Patient Active Problem List   Diagnosis Code    Hirsutism L68.0    Virilization ([address] E25.9    Bilateral carotid artery disease ([facility] I77.9    Controlled type 2 diabetes mellitus, without long-term current use of insulin ([address] E11.9    Essential hypertension I10    Other hyperlipidemia E78.49    Class 1 obesity in adult E66.9    History of CVA (cerebrovascular accident) Z86.73         Objective:   Vitals - reviewed  Visit Vitals  /81 (BP 1 Location: Left arm, BP Patient Position: Sitting)   Pulse (!) 58   Temp 98.2 °F (36.8 °C) (Oral)   Ht 5' 9\" (1.753 m)   SpO2 95%   BMI 34.26 kg/m²        Physical Exam   Constitutional: She appears well-developed and well-nourished. Cardiovascular: Normal rate and regular rhythm. Pulmonary/Chest: Effort normal and breath sounds normal.   Neurological:   Alert and oriented to person and place but not time. She said she is 50years old    Psychiatric: She is slowed. Cognition and memory are impaired. She exhibits a depressed mood. She is noncommunicative. Assessment and orders:       ICD-10-CM ICD-9-CM    1. Anxiety and depression F41.9 300.00 REFERRAL TO PSYCHIATRY    F32.9 311    2. Memory impairment R41.3 780.93    3. Neuropathy G62.9 355.9 gabapentin (NEURONTIN) 100 mg capsule     Diagnoses and all orders for this visit:    1. Anxiety and depression: Worsened after her boyfriend left her. Unable to get psychiatry appointment sooner. Currently on Zoloft 100 mg.  Son filled out XUAN 7 and PHQ 9. (PHQ 9 score: 21 XUAN 7: 17)  based on his assessment as patient was no cooperative to answer questions. No bruises or signs of abuse noted at this time. Both patient and son denied physical or emotional abuse.   -     REFERRAL TO PSYCHIATRY  -    Referral for in patient rehab with the help of nurse navigator. 2. Memory impairment: A&Ox 2, not oriented to time. Had neurocognitive assessment and has appointment with neurology in 3 weeks     3. Neuropathy  -     gabapentin (NEURONTIN) 100 mg capsule; TAKE ONE CAPSULE BY MOUTH ONE TIME DAILY AT NIGHT    Other orders  -     atorvastatin (LIPITOR) 40 mg tablet; TAKE ONE TABLET BY MOUTH ONE TIME DAILY        POC: [phone]- Joeerika Rubalcava( son)         Pt was discussed with Dr. Nellie Ramos  (attending physician). I have reviewed patient medical and social history and medications. I have reviewed pertinent labs results and other data. I have discussed the diagnosis with the patient and the intended plan as seen in the above orders. The patient has received an after-visit summary and questions were answered concerning future plans. I have discussed medication side effects and warnings with the patient as well.     Luigi Chicas MD- [address] [facility]  09/09/19

## 2019-09-09 NOTE — PROGRESS NOTES
Chief Complaint   Patient presents with    Follow-up     anxiety and depression     1. Have you been to the ER, urgent care clinic since your last visit? Hospitalized since your last visit? No    2. Have you seen or consulted any other health care providers outside of the [address] [place] [address] since your last visit? Include any pap smears or colon screening. no

## 2019-09-25 NOTE — LETTER
9/25/19 Patient: Wallace Code YOB: 1955 Date of Visit: 9/25/2019 MD Jose Tejeda Jesus [address] [facility] [address] [address] VIA In Basket Dear Tamara Dick MD, Thank you for referring Ms. Samantha Jolley to [facility] for evaluation. My notes for this consultation are attached. If you have questions, please do not hesitate to call me. I look forward to following your patient along with you.  
 
 
Sincerely, 
 
Priyanka Iqbal MD

## 2019-09-25 NOTE — PATIENT INSTRUCTIONS
[facility]   Statement to Patients  April 1, 2014      In an effort to ensure the large volume of patient prescription refills is processed in the most efficient and expeditious manner, we are asking our patients to assist us by calling your Pharmacy for all prescription refills, this will include also your  Mail Order Pharmacy. The pharmacy will contact our office electronically to continue the refill process. Please do not wait until the last minute to call your pharmacy. We need at least 48 hours (2days) to fill prescriptions. We also encourage you to call your pharmacy before going to  your prescription to make sure it is ready. With regard to controlled substance prescription refill requests (narcotic refills) that need to be picked up at our office, we ask your cooperation by providing us with at least 72 hours (3days) notice that you will need a refill. We will not refill narcotic prescription refill requests after 4:00pm on any weekday, Monday through Thursday, or after 2:00pm on Fridays, or on the weekends. We encourage everyone to explore another way of getting your prescription refill request processed using Cor[employer], our patient web portal through our electronic medical record system. Corimmun is an efficient and effective way to communicate your medication request directly to the office and  downloadable as an kayla on your smart phone . Corimmun also features a review functionality that allows you to view your medication list as well as leave messages for your physician. Are you ready to get connected? If so please review the attatched instructions or speak to any of our staff to get you set up right away! Thank you so much for your cooperation. Should you have any questions please contact our Practice Administrator. The Physicians and Staff,  [facility]     Patient history reviewed patient examined.   Went over neuropsychological testing in April and per the neuropsychologist suggestions the drug Aricept will be put into place and titrated accordingly. Went over potential side effects which hopefully will not be the case or problematic. Stays reasonably busy as possible and would like to make better suggestions for psychiatry referral but I basically have none. We will go ahead and suggest a follow-up visit with neuropsychology since that was suggested from the original testing timeframe.

## 2019-09-25 NOTE — PROGRESS NOTES
Neurology Consult      Subjective:      Emilee Tafoya is a 60 y.o. female who comes in today with neuropsych testing done in April of this year. Her first and only visit with me was on July 5, 2018. The neuropsych testing as referenced suggested an admixture of vascular and Alzheimer's contributions. Strong suggestions to psychiatric intervention for depression and anxiety although it is been near impossible to make that happen up to this point. Suggestion of power of  and revisit in 6 months to check changes. Stay busy institute memory medicine supervised medicines and finances. It was discovered that at home she really does not do much but sit around and watch TV. Not really interested in any socialization type process and we talked about the importance of that. Currently eating and sleeping are fine. Talked about the importance of staying motivated and am hoping again that with psychiatric intervention this can spark some legitimate progress in that direction. Will introduce Aricept by titration and went over potential side effects. Patient and company understand this is a supportive drug and is not curative or restraining. If for any reason this is not tolerated we might consider the Exelon patch. For a later discussion we could talk about Namenda as an add on helper drug. I think the critical agenda first step is to get her interests peaked and to helping herself. Again psychiatry may be of immense help here. Revisit 2 months. Current Outpatient Medications   Medication Sig Dispense Refill    donepezil (ARICEPT) 5 mg tablet Take 1 Tab by mouth daily. 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 30 Tab 1    glipiZIDE (GLUCOTROL) 10 mg tablet TAKE ONE TABLET BY MOUTH ONE TIME DAILY  6    metoprolol succinate (TOPROL-XL) 50 mg XL tablet TAKE ONE TABLET BY MOUTH ONE TIME DAILY  5    aspirin (ASPIRIN) 325 mg tablet Take 1 Tab by mouth daily. Indications: Cerebral Thromboembolism Prevention 1 Tab 0    acetaminophen/diphenhydramine (TYLENOL PM PO) Take  by mouth.       clopidogrel (PLAVIX) 75 mg tab TAKE ONE TABLET BY MOUTH ONE TIME DAILY  6      Allergies   Allergen Reactions    Pcn [Penicillins] Rash    Tramadol Nausea and Vomiting     Past Medical History:   Diagnosis Date    Depression     Diverticulitis 3/2013    [facility]     Hirsutism     HTN (hypertension)     Hypercholesterolemia     [facility]     Type II or unspecified type diabetes mellitus without mention of complication, not stated as uncontrolled     Virilization (HCC)       Past Surgical History:   Procedure Laterality Date    HX APPENDECTOMY  1969    HX OTHER SURGICAL      ovaries removed       Social History     Socioeconomic History    Marital status:      Spouse name: Not on file    Number of children: Not on file    Years of education: Not on file    Highest education level: Not on file   Occupational History    Not on file   Social Needs    Financial resource strain: Not on file    Food insecurity:     Worry: Not on file     Inability: Not on file    Transportation needs:     Medical: Not on file     Non-medical: Not on file   Tobacco Use    Smoking status: Former Smoker     Last attempt to quit: 2018     Years since quittin.4    Smokeless tobacco: Never Used    Tobacco comment: 2018   Substance and Sexual Activity    Alcohol use: No    Drug use: Yes     Types: Marijuana    Sexual activity: Not on file   Lifestyle    Physical activity:     Days per week: Not on file     Minutes per session: Not on file    Stress: Not on file   Relationships    Social connections:     Talks on phone: Not on file     Gets together: Not on file     Attends Voodoo service: Not on file     Active member of club or organization: Not on file     Attends meetings of clubs or organizations: Not on file     Relationship status: Not on file    Intimate partner violence:     Fear of current or ex partner: Not on file     Emotionally abused: Not on file     Physically abused: Not on file     Forced sexual activity: Not on file   Other Topics Concern    Not on file   Social History Narrative    Not on file      Family History   Problem Relation Age of Onset    Heart Disease Mother     Headache Mother     Stroke Mother     Diabetes Mother     Heart Disease Father     Headache Father       Visit Vitals  Resp 16   Ht 5' 9\" (1.753 m)   SpO2 97%   BMI 34.26 kg/m²        Review of Systems:   A comprehensive review of systems was negative except for that written in the HPI. Neuro Exam:     Appearance: The patient is well developed, well nourished, unable to provide a coherent history and is in no acute distress. Mental Status: Oriented to day and year and close on the date and difficulty with month. Mood and affect inappropriate. Very detached despondent and never offered spontaneous communication which when present was very truncated and depressing   Cranial Nerves:   Intact visual fields. Fundi are benign. ACACIA, EOM's full, no nystagmus, no ptosis. Facial sensation is normal. Corneal reflexes are intact. Facial movement is symmetric. Hearing is normal bilaterally. Palate is midline with normal sternocleidomastoid and trapezius muscles are normal. Tongue is midline. Motor:  5/5 strength in upper and lower proximal and distal muscles. Normal bulk and tone.  No fasciculations. Reflexes:   Deep tendon reflexes 2+/4 and symmetrical.   Sensory:   Normal to touch, pinprick and vibration. Gait:   Came in a wheelchair    Tremor:   No tremor noted. Cerebellar:  No cerebellar signs present. Neurovascular:  Normal heart sounds and regular rhythm, peripheral pulses intact, and no carotid bruits. Assessment:   Dementia. Please see above dictation. Hopefully the psychiatric connection can be made for depression and anxiety. We will initiate the Aricept by titration from 5 to 10 mg over 30 days. Will suggest revisit in 2 months to check progress. Plan:   Revisit 2 months.   Signed by :  Salma Montalvo MD

## 2019-09-25 NOTE — TELEPHONE ENCOUNTER
Nelda Harrington from [facility] said they received 2 Aricept prescriptions today one for 5mg and 10mg. They need to know which one to fill. The patient is on the way there now.

## 2019-10-02 NOTE — TELEPHONE ENCOUNTER
----- Message from Lethaniel Castleman sent at 10/2/2019 11:58 AM EDT -----  Regarding: Dr. Rosemarie Kelly, Friend, is stating that [facility] has not received any paper work for the pt. Best contact number [phone]-  [phone].

## 2019-10-02 NOTE — TELEPHONE ENCOUNTER
Warden Sheth [phone]  On hippa, pt friend, states she was told 2 weeks ago that someone would be calling to set up PT.       Provided number to [facility] and explained to call back with appt details

## 2019-10-04 NOTE — TELEPHONE ENCOUNTER
Dr Nancy Farr:    Spoke with the friend (kay mckee), regarding this patient & about her possibly going to a Rehab facility. ... Need to know your recommendation on this. ... Will you call & speak with the friend & she can explain exactly what she is asking you to do. ... Please call. ...     Thanks    [place] B

## 2019-10-07 NOTE — TELEPHONE ENCOUNTER
Called [facility] about patient receiving an appointment for inpatient rehabilitation. ... They informed me that, they will need demographics, physicians order & therapy notes. ... This has been faxed to them & I informed patients friend (kay mckee), about this. ... They will review notes & will contact patient. ...

## 2019-10-07 NOTE — TELEPHONE ENCOUNTER
Dr Flores Nicely:    Faith Thapa to make arrangements for this patient to get inpatient rehab & they said she needed more recent physical therapy. ... I set up an appointment for this patient, but it was not what they wanted. ... They want to discuss whether or not they will continue this line of treatment. ... Once they figure out what they want to do, they will give me a call back to discuss an appointment. ... Any questions, call me. ...     Thanks    [facility] B

## 2019-12-04 NOTE — TELEPHONE ENCOUNTER
Shawna Salazar with [facility] calling, notes they faxed over a request for CMN for Incontinence Supplies 12/3/19. Asking for a verbal order while awaiting paperwork to be able to ship patient supplies.     Call [phone]

## 2020-01-01 ENCOUNTER — ED HISTORICAL/CONVERTED ENCOUNTER (OUTPATIENT)
Dept: OTHER | Age: 65
End: 2020-01-01

## 2020-01-01 ENCOUNTER — OP HISTORICAL/CONVERTED ENCOUNTER (OUTPATIENT)
Dept: OTHER | Age: 65
End: 2020-01-01

## 2020-01-01 ENCOUNTER — TELEPHONE (OUTPATIENT)
Dept: FAMILY MEDICINE CLINIC | Age: 65
End: 2020-01-01

## 2020-01-03 NOTE — TELEPHONE ENCOUNTER
----- Message from Mack Aguilar sent at 1/3/2020 11:45 AM EST -----  Regarding: Dr. Jorge Rainey first and last name: Ancelmo mejia [facility]      Reason for call:request for medical necessity      Callback required yes/no and why:yes      Best contact number(s):[phone]      Details to clarify the request: received request for medical necessity      Mack Aguilar

## 2020-03-31 NOTE — TELEPHONE ENCOUNTER
Spoke with Ms Hortensia Brady. Ms Olga Lidia Solis recently had carotid ultrasound at [facility] due to other issues pt was having. Ms Hortensia Brady will call our office if pt needs another appt.

## 2020-09-01 ENCOUNTER — TELEPHONE (OUTPATIENT)
Dept: FAMILY MEDICINE CLINIC | Age: 65
End: 2020-09-01

## 2021-07-12 NOTE — PROGRESS NOTES
TSH, Folate, B12 normal   CBC normal  Urine microalb/creat ratio > 300  Pt already on ACEi    Will send letter UC visit on 7/10/21 for vaginal discharge. Chlamydia and Gonorrhea were both negative. Urine culture was also negative.    Patient is aware of above. Denies questions.